# Patient Record
Sex: FEMALE | Race: WHITE | NOT HISPANIC OR LATINO | Employment: OTHER | URBAN - METROPOLITAN AREA
[De-identification: names, ages, dates, MRNs, and addresses within clinical notes are randomized per-mention and may not be internally consistent; named-entity substitution may affect disease eponyms.]

---

## 2017-06-19 ENCOUNTER — CONVERSION ENCOUNTER (OUTPATIENT)
Dept: GENERAL RADIOLOGY | Facility: HOSPITAL | Age: 57
End: 2017-06-19

## 2018-02-13 ENCOUNTER — OFFICE VISIT CONVERTED (OUTPATIENT)
Dept: ORTHOPEDIC SURGERY | Facility: CLINIC | Age: 58
End: 2018-02-13
Attending: PHYSICIAN ASSISTANT

## 2018-03-01 ENCOUNTER — OFFICE VISIT CONVERTED (OUTPATIENT)
Dept: ORTHOPEDIC SURGERY | Facility: CLINIC | Age: 58
End: 2018-03-01
Attending: ORTHOPAEDIC SURGERY

## 2018-07-12 ENCOUNTER — CONVERSION ENCOUNTER (OUTPATIENT)
Dept: GENERAL RADIOLOGY | Facility: HOSPITAL | Age: 58
End: 2018-07-12

## 2018-08-21 ENCOUNTER — OFFICE VISIT CONVERTED (OUTPATIENT)
Dept: GASTROENTEROLOGY | Facility: CLINIC | Age: 58
End: 2018-08-21
Attending: INTERNAL MEDICINE

## 2018-08-21 ENCOUNTER — CONVERSION ENCOUNTER (OUTPATIENT)
Dept: GASTROENTEROLOGY | Facility: CLINIC | Age: 58
End: 2018-08-21

## 2019-06-03 ENCOUNTER — HOSPITAL ENCOUNTER (OUTPATIENT)
Dept: OTHER | Facility: HOSPITAL | Age: 59
Discharge: HOME | End: 2019-06-03
Attending: INTERNAL MEDICINE

## 2019-06-03 LAB
25(OH)D3 SERPL-MCNC: 54.9 NG/ML (ref 30–100)
CALCIUM SERPL-MCNC: 9.7 MG/DL (ref 8.7–10.4)

## 2019-08-21 ENCOUNTER — HOSPITAL ENCOUNTER (OUTPATIENT)
Dept: GENERAL RADIOLOGY | Facility: HOSPITAL | Age: 59
Discharge: HOME | End: 2019-08-21
Attending: FAMILY MEDICINE

## 2019-11-27 ENCOUNTER — HOSPITAL ENCOUNTER (OUTPATIENT)
Dept: URGENT CARE | Facility: CLINIC | Age: 59
Discharge: HOME | End: 2019-11-27

## 2020-01-13 ENCOUNTER — HOSPITAL ENCOUNTER (OUTPATIENT)
Dept: GENERAL RADIOLOGY | Facility: HOSPITAL | Age: 60
Discharge: HOME | End: 2020-01-13
Attending: INTERNAL MEDICINE

## 2020-01-13 LAB
25(OH)D3 SERPL-MCNC: 42.2 NG/ML (ref 30–100)
CALCIUM SERPL-MCNC: 10.3 MG/DL (ref 8.7–10.4)

## 2020-01-14 ENCOUNTER — OFFICE VISIT CONVERTED (OUTPATIENT)
Dept: FAMILY MEDICINE CLINIC | Facility: CLINIC | Age: 60
End: 2020-01-14
Attending: NURSE PRACTITIONER

## 2020-01-14 ENCOUNTER — CONVERSION ENCOUNTER (OUTPATIENT)
Dept: FAMILY MEDICINE CLINIC | Facility: CLINIC | Age: 60
End: 2020-01-14

## 2020-01-22 ENCOUNTER — OFFICE VISIT CONVERTED (OUTPATIENT)
Dept: FAMILY MEDICINE CLINIC | Facility: CLINIC | Age: 60
End: 2020-01-22
Attending: NURSE PRACTITIONER

## 2020-03-18 ENCOUNTER — CONVERSION ENCOUNTER (OUTPATIENT)
Dept: FAMILY MEDICINE CLINIC | Facility: CLINIC | Age: 60
End: 2020-03-18

## 2020-03-18 ENCOUNTER — OFFICE VISIT CONVERTED (OUTPATIENT)
Dept: FAMILY MEDICINE CLINIC | Facility: CLINIC | Age: 60
End: 2020-03-18
Attending: NURSE PRACTITIONER

## 2020-04-20 ENCOUNTER — OFFICE VISIT CONVERTED (OUTPATIENT)
Dept: FAMILY MEDICINE CLINIC | Facility: CLINIC | Age: 60
End: 2020-04-20
Attending: NURSE PRACTITIONER

## 2020-05-04 ENCOUNTER — OFFICE VISIT CONVERTED (OUTPATIENT)
Dept: FAMILY MEDICINE CLINIC | Facility: CLINIC | Age: 60
End: 2020-05-04
Attending: FAMILY MEDICINE

## 2020-07-06 ENCOUNTER — HOSPITAL ENCOUNTER (OUTPATIENT)
Dept: GENERAL RADIOLOGY | Facility: HOSPITAL | Age: 60
Discharge: HOME | End: 2020-07-06
Attending: INTERNAL MEDICINE

## 2020-07-06 LAB
25(OH)D3 SERPL-MCNC: 56.1 NG/ML (ref 30–100)
CALCIUM SERPL-MCNC: 10 MG/DL (ref 8.7–10.4)

## 2020-07-07 ENCOUNTER — OFFICE VISIT CONVERTED (OUTPATIENT)
Dept: ORTHOPEDIC SURGERY | Facility: CLINIC | Age: 60
End: 2020-07-07
Attending: ORTHOPAEDIC SURGERY

## 2020-07-17 ENCOUNTER — TELEPHONE CONVERTED (OUTPATIENT)
Dept: FAMILY MEDICINE CLINIC | Facility: CLINIC | Age: 60
End: 2020-07-17
Attending: NURSE PRACTITIONER

## 2020-07-25 ENCOUNTER — HOSPITAL ENCOUNTER (OUTPATIENT)
Dept: URGENT CARE | Facility: CLINIC | Age: 60
Discharge: HOME | End: 2020-07-25

## 2020-07-31 ENCOUNTER — TELEPHONE CONVERTED (OUTPATIENT)
Dept: FAMILY MEDICINE CLINIC | Facility: CLINIC | Age: 60
End: 2020-07-31
Attending: FAMILY MEDICINE

## 2020-07-31 ENCOUNTER — HOSPITAL ENCOUNTER (OUTPATIENT)
Dept: OTHER | Facility: HOSPITAL | Age: 60
Discharge: HOME | End: 2020-07-31
Attending: FAMILY MEDICINE

## 2020-07-31 LAB
ALBUMIN SERPL-MCNC: 4.3 G/DL (ref 3.5–5)
ALBUMIN/GLOB SERPL: 1.4 {RATIO} (ref 1.4–2.6)
ALP SERPL-CCNC: 63 U/L (ref 53–141)
ALT SERPL-CCNC: 22 U/L (ref 10–40)
ANION GAP SERPL CALC-SCNC: 21 MMOL/L (ref 8–19)
AST SERPL-CCNC: 19 U/L (ref 15–50)
BASOPHILS # BLD AUTO: 0.02 10*3/UL (ref 0–0.2)
BASOPHILS NFR BLD AUTO: 0.2 % (ref 0–3)
BILIRUB SERPL-MCNC: 0.27 MG/DL (ref 0.2–1.3)
BUN SERPL-MCNC: 7 MG/DL (ref 5–25)
BUN/CREAT SERPL: 10 {RATIO} (ref 6–20)
CALCIUM SERPL-MCNC: 10.6 MG/DL (ref 8.7–10.4)
CHLORIDE SERPL-SCNC: 93 MMOL/L (ref 99–111)
CONV ABS IMM GRAN: 0.06 10*3/UL (ref 0–0.2)
CONV CO2: 30 MMOL/L (ref 22–32)
CONV IMMATURE GRAN: 0.7 % (ref 0–1.8)
CONV TOTAL PROTEIN: 7.3 G/DL (ref 6.3–8.2)
CREAT UR-MCNC: 0.73 MG/DL (ref 0.5–0.9)
CRP SERPL-MCNC: 3.9 MG/L (ref 0–5)
D DIMER PPP FEU-MCNC: 0.43 MG/L (ref 0–0.59)
DEPRECATED RDW RBC AUTO: 38.4 FL (ref 36.4–46.3)
EOSINOPHIL # BLD AUTO: 0.05 10*3/UL (ref 0–0.7)
EOSINOPHIL # BLD AUTO: 0.6 % (ref 0–7)
ERYTHROCYTE [DISTWIDTH] IN BLOOD BY AUTOMATED COUNT: 12.2 % (ref 11.7–14.4)
GFR SERPLBLD BASED ON 1.73 SQ M-ARVRAT: >60 ML/MIN/{1.73_M2}
GLOBULIN UR ELPH-MCNC: 3 G/DL (ref 2–3.5)
GLUCOSE SERPL-MCNC: 155 MG/DL (ref 65–99)
HCT VFR BLD AUTO: 39.9 % (ref 37–47)
HGB BLD-MCNC: 13.5 G/DL (ref 12–16)
LYMPHOCYTES # BLD AUTO: 1.21 10*3/UL (ref 1–5)
LYMPHOCYTES NFR BLD AUTO: 14.3 % (ref 20–45)
MCH RBC QN AUTO: 28.9 PG (ref 27–31)
MCHC RBC AUTO-ENTMCNC: 33.8 G/DL (ref 33–37)
MCV RBC AUTO: 85.4 FL (ref 81–99)
MONOCYTES # BLD AUTO: 0.46 10*3/UL (ref 0.2–1.2)
MONOCYTES NFR BLD AUTO: 5.4 % (ref 3–10)
NEUTROPHILS # BLD AUTO: 6.68 10*3/UL (ref 2–8)
NEUTROPHILS NFR BLD AUTO: 78.8 % (ref 30–85)
NRBC CBCN: 0 % (ref 0–0.7)
OSMOLALITY SERPL CALC.SUM OF ELEC: 291 MOSM/KG (ref 273–304)
PLATELET # BLD AUTO: 364 10*3/UL (ref 130–400)
PMV BLD AUTO: 8.3 FL (ref 9.4–12.3)
POTASSIUM SERPL-SCNC: 3.6 MMOL/L (ref 3.5–5.3)
RBC # BLD AUTO: 4.67 10*6/UL (ref 4.2–5.4)
SODIUM SERPL-SCNC: 140 MMOL/L (ref 135–147)
T4 FREE SERPL-MCNC: 1.4 NG/DL (ref 0.9–1.8)
TSH SERPL-ACNC: 0.29 M[IU]/L (ref 0.27–4.2)
WBC # BLD AUTO: 8.48 10*3/UL (ref 4.8–10.8)

## 2020-08-03 ENCOUNTER — HOSPITAL ENCOUNTER (OUTPATIENT)
Dept: GENERAL RADIOLOGY | Facility: HOSPITAL | Age: 60
Discharge: HOME | End: 2020-08-03
Attending: FAMILY MEDICINE

## 2020-08-03 LAB
EST. AVERAGE GLUCOSE BLD GHB EST-MCNC: 131 MG/DL
HBA1C MFR BLD: 6.2 % (ref 3.5–5.7)
PTH-INTACT SERPL-MCNC: 23 PG/ML (ref 11.1–79.5)

## 2020-08-04 LAB — CONV CALCIUM IONIZED: 5.5 MG/DL (ref 4.5–5.6)

## 2020-10-20 ENCOUNTER — HOSPITAL ENCOUNTER (OUTPATIENT)
Dept: GENERAL RADIOLOGY | Facility: HOSPITAL | Age: 60
Discharge: HOME | End: 2020-10-20
Attending: NURSE PRACTITIONER

## 2020-11-27 ENCOUNTER — HOSPITAL ENCOUNTER (OUTPATIENT)
Dept: URGENT CARE | Facility: CLINIC | Age: 60
Discharge: HOME | End: 2020-11-27
Attending: PHYSICIAN ASSISTANT

## 2020-12-13 ENCOUNTER — HOSPITAL ENCOUNTER (OUTPATIENT)
Dept: URGENT CARE | Facility: CLINIC | Age: 60
Discharge: HOME | End: 2020-12-13
Attending: EMERGENCY MEDICINE

## 2020-12-16 LAB — SARS-COV-2 RNA SPEC QL NAA+PROBE: NOT DETECTED

## 2021-04-12 ENCOUNTER — HOSPITAL ENCOUNTER (OUTPATIENT)
Dept: GENERAL RADIOLOGY | Facility: HOSPITAL | Age: 61
Discharge: HOME | End: 2021-04-12
Attending: INTERNAL MEDICINE

## 2021-04-12 LAB — CALCIUM SERPL-MCNC: 9.7 MG/DL (ref 8.7–10.4)

## 2021-04-13 LAB — 25(OH)D3 SERPL-MCNC: 42.5 NG/ML (ref 30–100)

## 2021-05-10 NOTE — H&P
History and Physical      Patient Name: Mahsa Spain   Patient ID: [id number]   Sex: Female   YOB: 1960    Primary Care Provider: Celsa BRYANT   Referring Provider: Celsa BRYANT    Visit Date: July 7, 2020    Provider: Mt Card MD   Location: [facility]   Location Address: [address] [place] [address]  [id number]   Location Phone: [phone]          Chief Complaint  · Left Knee Pain      History Of Present Illness  Mahsa Spain is a 59 year old /White female who presents today to [facility].      The patient presents today for evaluation of left knee pain. She reports the left knee has a nagging pain and is interested in an injection today. Patient reports she has retired and is trying to rest as she can. She reports the right knee is doing well and had this replaced in 2014.                 Past Medical History  Arthritis; Hemorrhoids; Hypertension; Primary osteoarthritis of left knee; Right Total Knee         Past Surgical History  *Other; Artificial Joints/Limbs; Colonoscopy; Hip fracture repair, right; Hysterectomy; Joint Surgery; Metal implants         Medication List  Benadryl 25 mg oral capsule; cranberry extract 500 mg oral capsule; diclofenac sodium 50 mg oral tablet,delayed release (DR/EC); elderberry fruit and flower 460-115 mg oral capsule; folic acid 400 mcg oral tablet; hydrocortisone 2.5 % topical cream; lisinopril-hydrochlorothiazide 20-25 mg oral tablet; magnesium 250 mg oral tablet; metoprolol succinate 25 mg oral tablet extended release 24 hr; potassium 99 mg oral tablet; Shingrix (PF) 50 mcg/0.5 mL intramuscular suspension for reconstitution; triamcinolone acetonide 0.1 % topical cream; Vitamin B-12 500 mcg oral lozenge; Vitamin C 500 mg oral capsule, extended release; Vitamin D3 400 unit oral capsule         Allergy List  NO KNOWN DRUG ALLERGIES       Allergies Reconciled  Family Medical History  Osteoarthrosis;  "Depression; Hypertension; Family history of certain chronic disabling diseases; arthritis; Osteoporosis; Family history of Arthritis         Social History  Alcohol (Never); Alcohol Use (Current some day); Denies substance abuse; .; Exercises regularly; lives alone; Recreational Drug Use (Never); Retired.; Teacher; 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); Working         Review of Systems  · Constitutional  o Denies  o : fever, chills, weight loss  · Cardiovascular  o Denies  o : chest pain, shortness of breath  · Gastrointestinal  o Denies  o : liver disease, heartburn, nausea, blood in stools  · Genitourinary  o Denies  o : painful urination, blood in urine  · Integument  o Denies  o : rash, itching  · Neurologic  o Denies  o : headache, weakness, loss of consciousness  · Musculoskeletal  o Denies  o : painful, swollen joints  · Psychiatric  o Denies  o : drug/alcohol addiction, anxiety, depression      Vitals  Date Time BP Position Site L\R Cuff Size HR RR TEMP (F) WT  HT  BMI kg/m2 BSA m2 O2 Sat        07/07/2020 09:23 AM      78 - R   173lbs 0oz 5'  3\" 30.65 1.87 97 %          Physical Examination  · Constitutional  o Appearance  o : well developed, well-nourished, no obvious deformities present  · Head and Face  o Head  o :   § Inspection  § : normocephalic  o Face  o :   § Inspection  § : no facial lesions  · Eyes  o Conjunctivae  o : conjunctivae normal  o Sclerae  o : sclerae white  · Ears, Nose, Mouth and Throat  o Ears  o :   § External Ears  § : appearance within normal limits  § Hearing  § : intact  o Nose  o :   § External Nose  § : appearance normal  · Neck  o Inspection/Palpation  o : normal appearance  o Range of Motion  o : full range of motion  · Respiratory  o Respiratory Effort  o : breathing unlabored  o Inspection of Chest  o : normal appearance  o Auscultation of Lungs  o : no audible wheezing or rales  · Cardiovascular  o Heart  o : regular rate  · Gastrointestinal  o Abdominal Examination  o : soft and " non-tender  · Skin and Subcutaneous Tissue  o General Inspection  o : intact, no rashes  · Psychiatric  o General  o : Alert and oriented x3  o Judgement and Insight  o : judgment and insight intact  o Mood and Affect  o : mood normal, affect appropriate  · Left Knee  o Inspection  o : Full extension to flexion of 120 degrees, sensation grossly intact, Positive pulses, stable to stress, full weight-bearing, ambulates with a nonantalgic gait, Non-tender to palpation, skin intact, no skin discoloration, mild swelling   · Injection Note/Aspiration Note  o Site  o : left knee   o Procedure  o : Procedure: After educating the patient, patient gave consent for procedure. After using Chloraprep, the joint space was injected. The patient tolerated the procedure well.   o Medication  o : 80 mg of DepoMedrol with 9cc of 1% Lidocaine  · In Office Procedures  o View  o : LAT/SUNRISE/STANDING   o Site  o : left, knee  o Indication  o : Left knee pain   o Study  o : X-rays ordered, taken in the office, and reviewed today.  o Xray  o : reveals advanced degenerative changes with bone on bone articulation, subchondral sclerosis, osteophyte formation   o Comparative Data  o : No comparative data found              Assessment  · Primary osteoarthritis of left knee     715.16/M17.12  · Left knee pain, unspecified chronicity     719.46/M25.562      Plan  · Orders  o Depo-Medrol injection 80mg () - 715.16/M17.12 - 07/07/2020   Lot [id number] Exp 06 2021 [employer] Administered by JULIO Card MD  o Knee Intra-articular Injection without US Guidance [facility] (80989) - 715.16/M17.12 - 07/07/2020   Lot [id number] Exp 07 01 2021 [employer] Administered by JULIO Card MD  o Knee (Left) [facility] ([id number]) - 719.46/M25.562 - 07/07/2020  · Medications  o Medications have been Reconciled  o Transition of Care or Provider Policy  · Instructions  o Reviewed the patient's Past Medical, Social, and Family history as well as the ROS at today's  visit, no changes.  o Call or return if worsening symptoms.  o X-ray ordered, taken and reviewed at this visit.  o Follow up as needed.  o The above service was scribed by Shantell Ortiz on my behalf and I attest to the accuracy of the note. mc  o She is making a trip to [place] and wished to proceed with a steroid injection today.             Electronically Signed by: Shantell Ortiz-, Other -Author on July 7, 2020 08:49:17 PM  Electronically Co-signed by: Mt Card MD -Reviewer on July 8, 2020 03:57:54 PM

## 2021-05-12 NOTE — PROGRESS NOTES
Progress Note      Patient Name: Mahsa Spain   Patient ID: [id number]   Sex: Female   YOB: 1960    Primary Care Provider: Celsa BRYANT   Referring Provider: Celsa BRYANT    Visit Date: April 20, 2020    Provider: MANNY Ogden   Location: [facility]   Location Address: [address] [place] [address] [address]  [place], [place]  [id number]   Location Phone: [phone]          Chief Complaint  · Rash on legs.      History Of Present Illness  Mahsa Spain is a 59 year old /White female who presents for evaluation and treatment of:      Pt c/o rash on bilat lower legs x 3 days. States she was outside weed-eating in tall grass on Saturday and noticed bumps after that. States they are mildly itchy, denies pain. Denies fever, cough, SOB. She has been using Neosporin to treat. Denies rash being present on any other body parts.       Past Medical History  Disease Name Date Onset Notes   Arthritis --  --    Hemorrhoids --  --    Hypertension --  --    Primary osteoarthritis of left knee 02/13/2018 --    Right Total Knee 06/12/2015 --          Past Surgical History  Procedure Name Date Notes   *Other --  right knee scope   Hip fracture repair, right --  --    Hysterectomy  --    Joint Surgery --  --    Metal implants --  --          Medication List  Name Date Started Instructions   cranberry extract 500 mg oral capsule  take 1 capsule by oral route daily   diclofenac sodium 50 mg oral tablet,delayed release (DR/EC) 03/18/2020 take 1 tablet (50 mg) by oral route 3 times per day   elderberry fruit and flower 460-115 mg oral capsule  take 1 capsule by oral route daily for 30 days   folic acid 400 mcg oral tablet  take 1 tablet (0.4 mg) by oral route once daily   lisinopril-hydrochlorothiazide 20-25 mg oral tablet  --    magnesium 250 mg oral tablet  take 1 tablet by oral route daily   metoprolol succinate 25 mg oral tablet extended release 24 hr  take 1  tablet (25 mg) by oral route once daily for 30 days   potassium 99 mg oral tablet  take 1 tablet by oral route daily for 30 days   Vitamin B-12 500 mcg oral lozenge  dissolve 1 lozenge by oral route daily   Vitamin C 500 mg oral capsule, extended release  take 1 capsule by oral route daily   Vitamin D3 400 unit oral capsule  take 1 capsule by oral route daily         Allergy List  Allergen Name Date Reaction Notes   NO KNOWN DRUG ALLERGIES --  --  --        Allergies Reconciled  Family Medical History  Disease Name Relative/Age Notes   Osteoarthrosis Mother/   --    Depression Mother/   --    Hypertension Mother/   --    Family history of certain chronic disabling diseases; arthritis Mother/   Mother  Mother; Brother   Osteoporosis Mother/   Mother         Social History  Finding Status Start/Stop Quantity Notes   Alcohol Never --/-- --  --    Denies substance abuse --  --/-- --  --    . --  --/-- --  --    Exercises regularly --  --/-- --  Walks daily x 30 minutes   lives alone --  --/-- --  --    Recreational Drug Use Never --/-- --  no   Teacher --  --/-- --  --    Tobacco Former --/-- --  former smoker  never smoker   Working --  --/-- --  --          Review of Systems  · Constitutional  o Denies  o : fever, fatigue, weight loss, weight gain  · HENT  o Denies  o : headaches  · Cardiovascular  o Denies  o : lower extremity edema, claudication, chest pressure, palpitations  · Respiratory  o Denies  o : shortness of breath, wheezing, cough, hemoptysis, dyspnea on exertion  · Gastrointestinal  o Denies  o : nausea, vomiting, diarrhea, constipation, abdominal pain  · Integument  o Admits  o : rash, pruritus  · Psychiatric  o Denies  o : anxiety, depression, suicidal ideation, homicidal ideation  · Allergic-Immunologic  o Denies  o : sinus allergy symptoms, frequent illnesses      Vitals  Date Time BP Position Site L\R Cuff Size HR RR TEMP (F) WT  HT  BMI kg/m2 BSA m2 O2 Sat        04/20/2020 03:37 PM  "144/73 Sitting    82 - R 20 98.3 168lbs 1oz 5'  3\" 29.77 1.84 99 %          Physical Examination  · Constitutional  o Appearance  o : no acute distress, well-nourished  · Head and Face  o Head  o :   § Inspection  § : atraumatic, normocephalic  o Face  o :   § Inspection  § : no facial lesions  · Eyes  o Eyes  o : extraocular movements intact, no scleral icterus, no conjunctival injection  · Ears, Nose, Mouth and Throat  o Ears  o :   § External Ears  § : normal  o Nose  o :   § Intranasal Exam  § : nares patent  o Oral Cavity  o :   § Oral Mucosa  § : moist mucous membranes  · Respiratory  o Respiratory Effort  o : breathing comfortably, symmetric chest rise  o Auscultation of Lungs  o : clear to asculatation bilaterally, no wheezes, rales, or rhonchii  · Cardiovascular  o Heart  o :   § Auscultation of Heart  § : regular rate and rhythm, no murmurs, rubs, or gallops  o Peripheral Vascular System  o :   § Extremities  § : no edema  · Lymphatic  o Neck  o : no lymphadenopathy present  o Supraclavicular Nodes  o : no supraclavicular nodes  · Skin and Subcutaneous Tissue  o General Inspection  o : papular eruption present, scattered, erythemic, raised, blister-like lesions bilat lower extremeties, no pus, discharge, or edema  · Neurologic  o Mental Status Examination  o :   § Orientation  § : grossly oriented to person, place and time  o Gait and Station  o :   § Gait Screening  § : normal gait  · Psychiatric  o General  o : normal mood and affect              Assessment  · Need for shingles vaccine     V04.89/Z23  · Rash     782.1/R21  · Chiggers (mites)     133.9/B88.9       Chiggers bites:  Benadryl 2 tabs q6hrs PRN; may cause drowsiness  Hydrocortisone 2.5% apply tid PRN; apply sparingly  Avoid scratching  keep clean and dry  Wear long sleeves/pants when outside in tall grass  Wear insect repellant when working outside  Wash skin with soap and water after outdoor activity  Cool showers  Calamine lotion PRN "     Order given for Shingles vaccine. Pt has never had vaccine before. Episode of shingles last year.     Problems Reconciled  Plan  · Orders  o ACO-39: Current medications updated and reviewed () - - 04/20/2020  o ACO-14: Influenza immunization administered or previously received () - - 04/20/2020   10/2019  · Medications  o hydrocortisone 2.5 % topical cream   SIG: apply a thin layer to the affected area(s) by topical route 3 times per day   DISP: (1) 3.5 gm tube with 0 refills  Prescribed on 04/20/2020     o Benadryl 25 mg oral capsule   SIG: take 2 capsules (50 mg) by oral route every 6 hours as needed   DISP: (60) capsules with 3 refills  Prescribed on 04/20/2020     o Shingrix (PF) 50 mcg/0.5 mL intramuscular suspension for reconstitution   SIG: inject 0.5 milliliter (50 mcg) by intramuscular route once repeat 0.5 mL dose 2 to 6 months after the first dose (total of 2 doses)   DISP: (1) Kit with 0 refills  Prescribed on 04/20/2020     o Medications have been Reconciled  o Transition of Care or Provider Policy  · Instructions  o Discussed the need for Shingles vaccination with patient.   o Patient is taking medications as prescribed and doing well.   o Take all medications as prescribed/directed.  o Rest. Increase Fluids.  o Patient was educated/instructed on their diagnosis, treatment and medications prior to discharge from the clinic today.  o Patient instructed to seek medical attention urgently for new or worsening symptoms.  o Call the office with any concerns or questions.  o Bring all medicines with their bottles to each office visit.  o Risks, benefits, and alternatives were discussed with the patient. The patient is aware of risks associated with: all meds and conditions  o Chronic conditions reviewed and taken into consideration for today's treatment plan.  o Discussed Covid-19 precautions including, but not limited to, social distancing, avoid touching your face, and hand washing.    · Disposition  o Call or Return if symptoms worsen or persist.  o Follow Up PRN.  o Proceed to ED for all medical emergencies.            Electronically Signed by: MANNY Ogden -Author on April 20, 2020 04:17:50 PM

## 2021-05-13 NOTE — PROGRESS NOTES
Quick Note      Patient Name: Mahsa Spain   Patient ID: [id number]   Sex: Female   YOB: 1960    Primary Care Provider: Celsa BRYANT   Referring Provider: Celsa BRYANT    Visit Date: July 31, 2020    Provider: Tato Duran DO   Location: [facility]   Location Address: [address] [place] [address] [address]  [place], [place]  [id number]   Location Phone: [phone]          History Of Present Illness  TELEHEALTH TELEPHONE VISIT  Chief Complaint: fatigue, not feeling right   Mahsa Spain is a 59 year old /White female who is presenting for evaluation via telehealth telephone visit. Verbal consent obtained before beginning visit.   Provider spent 8 minutes with patient during telehealth visit.   The following staff were present during this visit: Tato Duran DO   Past Medical History/Overview of Patient Symptoms       Patient calls complaining of not feeling herself was seen in urgent care recently and had blood work with CBC mild white blood cell count elevation EKG normal orthostatics potentially showing orthostatic hypotension.  Advised increase fluids and follow-up with PCP or doctor's office and she says she done that and has not had any more episodes like she did but is still not feeling herself.  Feels fatigue no fever sick contacts chest pain palpitations headache or other    She did go to the zoo couple weeks ago in [place] and says she followed all guidelines and such denies shortness of breath           Assessment  · Fatigue     780.79/R53.83  · Syncope     780.2/R55  · Leukocytosis     288.60/D72.829  · URI (upper respiratory infection)     465.9/J06.9         Leukocytosis  Syncope  URI  Fatigue  Dehydration  *Subacute  Travel to [place] to 2 weeks ago, has been feeling fatigued and like something is not right, had a syncopal event and orthostatics labs unremarkable other than elevated white blood cell at urgent care  EKG normal  additionally at that time  Repeat CBC, will add CMP thyroid panel additionally CRP d-dimer close follow-up and monitoring  Consider echo repeat EKG or Holter monitor  Increase fluids monitor symptoms follow-up as appropriate ED if worse    Follow-up as above labs today review of symptoms possible       Plan  · Orders  o CMP [facility] (19772) - 780.79/R53.83 - 07/31/2020  o CBC with Auto Diff [facility] (70272) - 780.2/R55, 780.79/R53.83, 288.60/D72.829, 465.9/J06.9 - 07/31/2020  o Thyroid Profile (20734, 31546, THYII) - 780.2/R55, 780.79/R53.83, 288.60/D72.829, 465.9/J06.9 - 07/31/2020  o ACO-39: Current medications updated and reviewed () - - 07/31/2020  o CRP (Inflammation) [facility] (02281) - 780.2/R55, 780.79/R53.83, 288.60/D72.829, 465.9/J06.9 - 07/31/2020  o D-dimer (83443) - 780.2/R55, 780.79/R53.83, 288.60/D72.829, 465.9/J06.9 - 07/31/2020  · Medications  o azithromycin 250 mg oral tablet   SIG: take 2 tablets (500 mg) by oral route once daily for 1 day then 1 tablet (250 mg) by oral route once daily for 4 days   DISP: (6) tablets with 0 refills  Discontinued on 07/31/2020     o Medrol (Henry) 4 mg oral tablets,dose pack   SIG: take as directed   DISP: (1) 21 ct dose-pack with 0 refills  Discontinued on 07/31/2020     o Tessalon Perles 100 mg oral capsule   SIG: take 1 capsule (100 mg) by oral route 3 times per day as needed for cough   DISP: (20) capsules with 0 refills  Discontinued on 07/31/2020     o Medications have been Reconciled  · Instructions  o Plan Of Care:   o Call the office with any concerns or questions.  o Risks, benefits, and alternatives were discussed with the patient. The patient is aware of risks associated with:  · Disposition  o Call or Return if symptoms worsen or persist.            Electronically Signed by: Tato Duran DO -Author on July 31, 2020 04:38:53 PM

## 2021-05-13 NOTE — PROGRESS NOTES
Progress Note      Patient Name: Mahsa Spain   Patient ID: [id number]   Sex: Female   YOB: 1960    Primary Care Provider: Celsa BRYANT   Referring Provider: Celsa BRYANT    Visit Date: May 4, 2020    Provider: Addis Hazel DO   Location: [facility]   Location Address: [address] [place] [address] [address]  [place], [place]  [id number]   Location Phone: [phone]          Chief Complaint  · Bilateral leg rash x 3-4 weeks       History Of Present Illness  Mahsa Spain is a 59 year old /White female who presents for evaluation and treatment of:      She is here today for an acute visit.  She was here approximately 3 weeks ago due to what appeared to be being bitten by insects.  She says that she was prescribed hydrocortisone cream and that her symptoms resolved.  She does admit that she has been outdoors mowing grass recently.  She is complained today of bilateral leg itching and rash.  She says she tried to apply the hydrocortisone cream she had left over on her legs this time but the rash has not significantly improved.       Past Medical History  Disease Name Date Onset Notes   Arthritis --  --    Hemorrhoids --  --    Hypertension --  --    Primary osteoarthritis of left knee 02/13/2018 --    Right Total Knee 06/12/2015 --          Past Surgical History  Procedure Name Date Notes   *Other --  right knee scope   Hip fracture repair, right --  --    Hysterectomy  --    Joint Surgery --  --    Metal implants --  --          Medication List  Name Date Started Instructions   Benadryl 25 mg oral capsule 04/20/2020 take 2 capsules (50 mg) by oral route every 6 hours as needed   cranberry extract 500 mg oral capsule  take 1 capsule by oral route daily   diclofenac sodium 50 mg oral tablet,delayed release (DR/EC) 03/18/2020 take 1 tablet (50 mg) by oral route 3 times per day   elderberry fruit and flower 460-115 mg oral capsule  take 1 capsule by oral  route daily for 30 days   folic acid 400 mcg oral tablet  take 1 tablet (0.4 mg) by oral route once daily   hydrocortisone 2.5 % topical cream 04/20/2020 apply a thin layer to the affected area(s) by topical route 3 times per day   lisinopril-hydrochlorothiazide 20-25 mg oral tablet  --    magnesium 250 mg oral tablet  take 1 tablet by oral route daily   metoprolol succinate 25 mg oral tablet extended release 24 hr  take 1 tablet (25 mg) by oral route once daily for 30 days   potassium 99 mg oral tablet  take 1 tablet by oral route daily for 30 days   Shingrix (PF) 50 mcg/0.5 mL intramuscular suspension for reconstitution 04/20/2020 inject 0.5 milliliter (50 mcg) by intramuscular route once repeat 0.5 mL dose 2 to 6 months after the first dose (total of 2 doses)   Vitamin B-12 500 mcg oral lozenge  dissolve 1 lozenge by oral route daily   Vitamin C 500 mg oral capsule, extended release  take 1 capsule by oral route daily   Vitamin D3 400 unit oral capsule  take 1 capsule by oral route daily         Allergy List  Allergen Name Date Reaction Notes   NO KNOWN DRUG ALLERGIES --  --  --          Family Medical History  Disease Name Relative/Age Notes   Osteoarthrosis Mother/   --    Depression Mother/   --    Hypertension Mother/   --    Family history of certain chronic disabling diseases; arthritis Mother/   Mother  Mother; Brother   Osteoporosis Mother/   Mother         Social History  Finding Status Start/Stop Quantity Notes   Alcohol Never --/-- --  --    Denies substance abuse --  --/-- --  --    . --  --/-- --  --    Exercises regularly --  --/-- --  Walks daily x 30 minutes   lives alone --  --/-- --  --    Recreational Drug Use Never --/-- --  no   Teacher --  --/-- --  --    Tobacco Former --/-- --  former smoker  never smoker   Working --  --/-- --  --          Review of Systems  · Constitutional  o * See HPI  · Cardiovascular  o * See HPI  · Respiratory  o * See HPI  · Gastrointestinal  o * See  "HPI  · Integument  o * See HPI      Vitals  Date Time BP Position Site L\R Cuff Size HR RR TEMP (F) WT  HT  BMI kg/m2 BSA m2 O2 Sat HC       05/04/2020 02:59 /68 Sitting    86 - R 18 98.1 168lbs 7oz 5'  3\" 29.84 1.84 98 %    05/04/2020 02:59 /69 Sitting                     Physical Examination  · Constitutional  o Appearance  o : overweight, well developed, alert, no obvious deformities present, no acute distress  · Head and Face  o Head  o :   § Inspection  § : atraumatic, normocephalic  · Ears, Nose, Mouth and Throat  o Ears  o :   § External Ears  § : normal  o Nose  o :   § Intranasal Exam  § : nares patent  o Oral Cavity  o :   § Oral Mucosa  § : moist mucous membranes  · Respiratory  o Respiratory Effort  o : breathing comfortably, symmetric chest rise  o Auscultation of Lungs  o : clear to asculatation bilaterally, no wheezes, rales, or rhonchii  · Cardiovascular  o Heart  o :   § Auscultation of Heart  § : regular rate and rhythm, no murmurs, rubs, or gallops  o Peripheral Vascular System  o :   § Extremities  § : no edema  · Skin and Subcutaneous Tissue  o General Inspection  o : Fine rash just above the sock line to mid shins bilateral lower extremities.  · Neurologic  o Mental Status Examination  o :   § Orientation  § : grossly oriented to person, place and time  o Gait and Station  o :   § Gait Screening  § : normal gait  · Psychiatric  o General  o : normal mood and affect              Assessment  · Rash     782.1/R21      Plan  · Orders  o ACO-39: Current medications updated and reviewed () - - 05/04/2020  o ACO-14: Influenza immunization administered or previously received () - - 05/04/2020  · Medications  o triamcinolone acetonide 0.1 % topical cream   SIG: apply a thin layer to the affected area(s) by topical route 2 times per day for 7 days   DISP: (1) 45 gm tube with 1 refills  Prescribed on 05/04/2020     o Medications have been Reconciled  o Transition of Care or Provider " Policy  · Instructions  o Patient was educated/instructed on their diagnosis, treatment and medications prior to discharge from the clinic today.  · Disposition  o Call or Return if symptoms worsen or persist.     1.  Rash: The patient was given a prescription today of triamcinolone cream to be used as directed.  She was encouraged to wear long pants and avoid contact with grass clippings.  Follow-up as needed.             Electronically Signed by: Addis Hazel DO -Author on May 17, 2020 07:50:49 PM

## 2021-05-13 NOTE — PROGRESS NOTES
Progress Note      Patient Name: Mahsa Spain   Patient ID: [id number]   Sex: Female   YOB: 1960    Primary Care Provider: Celsa BRYANT   Referring Provider: Celsa BRYANT    Visit Date: July 17, 2020    Provider: MANNY Ogden   Location: [facility]   Location Address: [address] [place] [address] [address]  [place], [place]  [id number]   Location Phone: [phone]          Chief Complaint  · Sinus congestion, pressure, drainage, and cough.  · No fever  · Did have a nose bleed yesterday, stated she usually has them when she feels like this.  · Itchy throat      History Of Present Illness  TELEHEALTH TELEPHONE VISIT  Chief Complaint: Sinus infection   Mahsa Spain is a 59 year old /White female who is presenting for evaluation via telehealth telephone visit. Verbal consent obtained before beginning visit.   Provider spent 12 minutes with patient during telehealth visit.   The following staff were present during this visit: Bethany Casiano MA & Celsa BRYANT.   Past Medical History/Overview of Patient Symptoms     Pt c/o sinus pain/pressure, nasal congestion, cough, scratchy throat, nasal drainage, HA x 3-4 days. Has taken Benadryl to treat.   Denies fever, SOB, muscle aches, sore throat, chills. Denies any recent travel or sick contacts.       Past Medical History  Disease Name Date Onset Notes   Arthritis --  --    Hemorrhoids --  --    Hypertension --  --    Primary osteoarthritis of left knee 02/13/2018 --    Right Total Knee 06/12/2015 --          Past Surgical History  Procedure Name Date Notes   *Other --  right knee scope   Artificial Joints/Limbs --  --    Colonoscopy --  --    Hip fracture repair, right --  --    Hysterectomy  --    Joint Surgery --  --    Metal implants --  --          Medication List  Name Date Started Instructions   Benadryl 25 mg oral capsule 04/20/2020 take 2 capsules (50 mg) by oral route every 6 hours as  needed   cranberry extract 500 mg oral capsule  take 1 capsule by oral route daily   elderberry fruit and flower 460-115 mg oral capsule  take 1 capsule by oral route daily for 30 days   folic acid 400 mcg oral tablet  take 1 tablet (0.4 mg) by oral route once daily   lisinopril-hydrochlorothiazide 20-25 mg oral tablet  --    magnesium 250 mg oral tablet  take 1 tablet by oral route daily   metoprolol succinate 25 mg oral tablet extended release 24 hr  take 1 tablet (25 mg) by oral route once daily for 30 days   potassium 99 mg oral tablet  take 1 tablet by oral route daily for 30 days   Vitamin B-12 500 mcg oral lozenge  dissolve 1 lozenge by oral route daily   Vitamin C 500 mg oral capsule, extended release  take 1 capsule by oral route daily   Vitamin D3 400 unit oral capsule  take 1 capsule by oral route daily         Allergy List  Allergen Name Date Reaction Notes   NO KNOWN DRUG ALLERGIES --  --  --        Allergies Reconciled  Family Medical History  Disease Name Relative/Age Notes   Osteoarthrosis Mother/   --    Depression Mother/   --    Hypertension Mother/   --    Family history of certain chronic disabling diseases; arthritis Mother/   Mother  Mother; Brother   Osteoporosis Mother/   Mother   Family history of Arthritis Mother/   Mother         Social History  Finding Status Start/Stop Quantity Notes   Alcohol Use Current some day --/-- --  rarely drinks, less than 1 drink per day   Denies substance abuse --  --/-- --  --    . --  --/-- --  --    Exercises regularly --  --/-- --  Walks daily x 30 minutes   lives alone --  --/-- --  --    Retired. --  --/-- --  --    Teacher --  --/-- --  --    Tobacco Never --/-- --  never smoker  former smoker  never smoker         Review of Systems  · Constitutional  o Denies  o : fatigue, fever, chills, body aches, night sweats  · Eyes  o Denies  o : double vision, blurred vision  · HENT  o Admits  o : headaches, sinus pain, nasal congestion, nasal  discharge, hoarseness  o Denies  o : sore throat, ear pain  · Cardiovascular  o Denies  o : chest pain, irregular heart beats  · Respiratory  o Admits  o : productive cough  o Denies  o : shortness of breath  · Gastrointestinal  o Denies  o : nausea, vomiting  · Integument  o Denies  o : new skin lesions  · Neurologic  o Denies  o : altered mental status, seizures  · Musculoskeletal  o Denies  o : joint swelling, limitation of motion  · Heme-Lymph  o Denies  o : petechiae, lymph node enlargement or tenderness  · Allergic-Immunologic  o Denies  o : frequent illnesses          Assessment  · Allergic rhinitis due to allergen     477.9/J30.9  · Cough     786.2/R05  · Essential hypertension     401.9/I10  · Acute non-recurrent maxillary sinusitis     461.0/J01.00  · Sinus congestion     478.19/R09.81       Max sinusitis:  Zpack UAD  Medrol Dose Henry UAD  Tessalon Perles 100mg PO tid PRN cough  Increase rest  Increase clear fluids  Ibu/tyl UAD q6 hrs PRN pain       Plan  · Orders  o ACO-39: Current medications updated and reviewed () - - 07/17/2020  · Medications  o azithromycin 250 mg oral tablet   SIG: take 2 tablets (500 mg) by oral route once daily for 1 day then 1 tablet (250 mg) by oral route once daily for 4 days   DISP: (6) tablets with 0 refills  Prescribed on 07/17/2020     o Medrol (Henry) 4 mg oral tablets,dose pack   SIG: take as directed   DISP: (1) 21 ct dose-pack with 0 refills  Prescribed on 07/17/2020     o Tessalon Perles 100 mg oral capsule   SIG: take 1 capsule (100 mg) by oral route 3 times per day as needed for cough   DISP: (20) capsules with 0 refills  Prescribed on 07/17/2020     o Medications have been Reconciled  o Transition of Care or Provider Policy  · Instructions  o Plan Of Care: sinusitis  o Chronic conditions reviewed and taken into consideration for today's treatment plan.  o Patient instructed to seek medical attention urgently for new or worsening symptoms.  o Patient was  educated/instructed on their diagnosis, treatment and medications prior to discharge from the clinic today.  o Take all medications as prescribed/directed.  o Rest. Increase Fluids.  o Call the office with any concerns or questions.  o Risks, benefits, and alternatives were discussed with the patient. The patient is aware of risks associated with: all meds and conditions.   o Discussed Covid-19 precautions including, but not limited to, social distancing, avoid touching your face, and hand washing.   · Disposition  o Call or Return if symptoms worsen or persist.  o Follow Up PRN.  o Proceed to ED for all medical emergencies.            Electronically Signed by: MANNY Ogden -Author on July 17, 2020 09:08:06 AM

## 2021-05-15 VITALS
HEIGHT: 63 IN | DIASTOLIC BLOOD PRESSURE: 71 MMHG | OXYGEN SATURATION: 99 % | BODY MASS INDEX: 30.16 KG/M2 | SYSTOLIC BLOOD PRESSURE: 154 MMHG | DIASTOLIC BLOOD PRESSURE: 65 MMHG | SYSTOLIC BLOOD PRESSURE: 148 MMHG | RESPIRATION RATE: 20 BRPM | HEART RATE: 72 BPM | WEIGHT: 170.25 LBS | TEMPERATURE: 97.6 F

## 2021-05-15 VITALS
DIASTOLIC BLOOD PRESSURE: 70 MMHG | RESPIRATION RATE: 20 BRPM | BODY MASS INDEX: 29.81 KG/M2 | HEIGHT: 63 IN | TEMPERATURE: 97.8 F | WEIGHT: 168.25 LBS | SYSTOLIC BLOOD PRESSURE: 140 MMHG | HEART RATE: 80 BPM | OXYGEN SATURATION: 99 %

## 2021-05-15 VITALS
WEIGHT: 168.44 LBS | TEMPERATURE: 98.1 F | DIASTOLIC BLOOD PRESSURE: 68 MMHG | SYSTOLIC BLOOD PRESSURE: 156 MMHG | OXYGEN SATURATION: 98 % | HEART RATE: 86 BPM | RESPIRATION RATE: 18 BRPM | HEIGHT: 63 IN | BODY MASS INDEX: 29.84 KG/M2

## 2021-05-15 VITALS
BODY MASS INDEX: 29.78 KG/M2 | HEART RATE: 82 BPM | DIASTOLIC BLOOD PRESSURE: 73 MMHG | OXYGEN SATURATION: 99 % | TEMPERATURE: 98.3 F | HEIGHT: 63 IN | WEIGHT: 168.06 LBS | RESPIRATION RATE: 20 BRPM | SYSTOLIC BLOOD PRESSURE: 144 MMHG

## 2021-05-15 VITALS — WEIGHT: 173 LBS | HEART RATE: 78 BPM | BODY MASS INDEX: 30.65 KG/M2 | OXYGEN SATURATION: 97 % | HEIGHT: 63 IN

## 2021-05-15 VITALS
BODY MASS INDEX: 29.77 KG/M2 | HEART RATE: 83 BPM | HEIGHT: 63 IN | WEIGHT: 168 LBS | OXYGEN SATURATION: 98 % | DIASTOLIC BLOOD PRESSURE: 67 MMHG | SYSTOLIC BLOOD PRESSURE: 141 MMHG

## 2021-05-16 VITALS — WEIGHT: 160 LBS | HEART RATE: 93 BPM | HEIGHT: 63 IN | BODY MASS INDEX: 28.35 KG/M2 | OXYGEN SATURATION: 98 %

## 2021-05-16 VITALS
WEIGHT: 170 LBS | DIASTOLIC BLOOD PRESSURE: 73 MMHG | BODY MASS INDEX: 30.12 KG/M2 | SYSTOLIC BLOOD PRESSURE: 132 MMHG | HEIGHT: 63 IN

## 2021-05-16 VITALS — WEIGHT: 160 LBS | HEART RATE: 78 BPM | OXYGEN SATURATION: 98 % | HEIGHT: 63 IN | BODY MASS INDEX: 28.35 KG/M2

## 2021-08-18 ENCOUNTER — TELEPHONE (OUTPATIENT)
Dept: FAMILY MEDICINE CLINIC | Facility: CLINIC | Age: 61
End: 2021-08-18

## 2021-08-18 DIAGNOSIS — Z12.31 ENCOUNTER FOR SCREENING MAMMOGRAM FOR MALIGNANT NEOPLASM OF BREAST: Primary | ICD-10-CM

## 2021-08-23 ENCOUNTER — TRANSCRIBE ORDERS (OUTPATIENT)
Dept: ADMINISTRATIVE | Facility: HOSPITAL | Age: 61
End: 2021-08-23

## 2021-08-23 DIAGNOSIS — M81.0 OSTEOPOROSIS, POST-MENOPAUSAL: Primary | ICD-10-CM

## 2021-08-24 RX ORDER — LISINOPRIL AND HYDROCHLOROTHIAZIDE 25; 20 MG/1; MG/1
TABLET ORAL
Qty: 90 TABLET | Refills: 1 | Status: SHIPPED | OUTPATIENT
Start: 2021-08-24 | End: 2022-02-16

## 2021-09-24 ENCOUNTER — OFFICE VISIT (OUTPATIENT)
Dept: FAMILY MEDICINE CLINIC | Facility: CLINIC | Age: 61
End: 2021-09-24

## 2021-09-24 VITALS
RESPIRATION RATE: 20 BRPM | HEIGHT: 63 IN | OXYGEN SATURATION: 98 % | DIASTOLIC BLOOD PRESSURE: 67 MMHG | HEART RATE: 81 BPM | TEMPERATURE: 98.2 F | WEIGHT: 165 LBS | BODY MASS INDEX: 29.23 KG/M2 | SYSTOLIC BLOOD PRESSURE: 140 MMHG

## 2021-09-24 DIAGNOSIS — J01.00 ACUTE NON-RECURRENT MAXILLARY SINUSITIS: ICD-10-CM

## 2021-09-24 DIAGNOSIS — H92.09 OTALGIA, UNSPECIFIED LATERALITY: Primary | ICD-10-CM

## 2021-09-24 DIAGNOSIS — J34.89 SINUS PAIN: ICD-10-CM

## 2021-09-24 DIAGNOSIS — M19.90 ARTHRITIS: ICD-10-CM

## 2021-09-24 PROCEDURE — 99213 OFFICE O/P EST LOW 20 MIN: CPT | Performed by: NURSE PRACTITIONER

## 2021-09-24 RX ORDER — DICLOFENAC SODIUM 75 MG/1
75 TABLET, DELAYED RELEASE ORAL 2 TIMES DAILY
Qty: 60 TABLET | Refills: 2 | Status: SHIPPED | OUTPATIENT
Start: 2021-09-24 | End: 2021-11-19

## 2021-09-24 RX ORDER — METOPROLOL SUCCINATE 25 MG/1
25 TABLET, EXTENDED RELEASE ORAL DAILY
COMMUNITY
Start: 2021-08-12 | End: 2021-12-10 | Stop reason: SDUPTHER

## 2021-09-24 RX ORDER — AZITHROMYCIN 250 MG/1
TABLET, FILM COATED ORAL
Qty: 6 TABLET | Refills: 0 | Status: SHIPPED | OUTPATIENT
Start: 2021-09-24 | End: 2021-12-10

## 2021-09-24 RX ORDER — METHYLPREDNISOLONE 4 MG/1
TABLET ORAL
Qty: 1 EACH | Refills: 0 | Status: SHIPPED | OUTPATIENT
Start: 2021-09-24 | End: 2021-12-10

## 2021-09-24 NOTE — PROGRESS NOTES
Chief Complaint   Patient presents with   • Sinus Problem     Left ear congestion, sinus congestion.       Subjective          Mahsa Spain presents to [facility]    Pt presents with sinus/nasal congestion, L ear pain, cough, sore throat x 1 week. Has taken OTC sinus medication to treat without success. Pt has had covid vaccine. Denies fever, chills, SOB, loss of taste or smell, N/V or other.     Pt c/o increased arthritis pain. Bilat hand arthritis. Reports no injuries or falls. Uses voltaren gel topical which helps some.       Past History:  Medical History: has a past medical history of Arthritis, Condition not found (06/12/2015), Hemorrhoids, HTN (hypertension), and Primary osteoarthritis of left knee (02/13/2018).   Surgical History: has a past surgical history that includes Colonoscopy; Hip fracture surgery (Right); Hysterectomy; Other surgical history; Other surgical history; Other surgical history; and Knee surgery (Right).   Family History: family history includes Arthritis in her brother and mother; Depression in her mother; Hypertension in her mother; Osteoarthritis in her mother; Osteoporosis in her mother.   Social History: reports that she has never smoked. She has never used smokeless tobacco. She reports current alcohol use. She reports that she does not use drugs.  Allergies: Patient has no known allergies.  (Not in a hospital admission)       Social History     Socioeconomic History   • Marital status:      Spouse name: Not on file   • Number of children: Not on file   • Years of education: Not on file   • Highest education level: Not on file   Tobacco Use   • Smoking status: Never Smoker   • Smokeless tobacco: Never Used   Vaping Use   • Vaping Use: Never used   Substance and Sexual Activity   • Alcohol use: Yes     Comment: Rarely drinks, less than 1 drink per day   • Drug use: Never   • Sexual activity: Defer       Health Maintenance Due   Topic Date  "Due   • ANNUAL PHYSICAL  Never done       Objective     Vital Signs:   /67   Pulse 81   Temp 98.2 °F (36.8 °C)   Resp 20   Ht 160 cm (63\")   Wt 74.8 kg (165 lb)   SpO2 98%   BMI 29.23 kg/m²       Physical Exam  Constitutional:       Appearance: Normal appearance.   HENT:      Head: Normocephalic and atraumatic.      Nose: Nose normal.   Eyes:      Conjunctiva/sclera: Conjunctivae normal.   Pulmonary:      Effort: Pulmonary effort is normal.   Neurological:      Mental Status: She is alert and oriented to person, place, and time.   Psychiatric:         Mood and Affect: Mood normal.         Behavior: Behavior normal.         Thought Content: Thought content normal.          Review of Systems   Constitutional: Negative for chills, fatigue and fever.   HENT: Positive for congestion, ear pain, sinus pressure and sore throat.    Eyes: Negative for blurred vision.   Respiratory: Positive for cough. Negative for shortness of breath.    Cardiovascular: Negative for chest pain, palpitations and leg swelling.   Gastrointestinal: Negative for abdominal pain, constipation, diarrhea, nausea, vomiting and GERD.   Musculoskeletal: Positive for arthralgias.   Skin: Negative for rash and skin lesions.   Neurological: Negative for dizziness and headache.   Psychiatric/Behavioral: Negative for sleep disturbance, suicidal ideas and depressed mood. The patient is not nervous/anxious.         Result Review :     Common labs    Common Labsle 4/12/21   Calcium 9.7                     Assessment and Plan    Diagnoses and all orders for this visit:    1. Otalgia, unspecified laterality (Primary)    2. Sinus pain    3. Acute non-recurrent maxillary sinusitis  Comments:  zpack UAD   Medrol dose yumiko UAD   Increase rest   increase clear fluid   Avoid allergy/sinus triggers     4. Arthritis  Comments:  Disc tx options   Start diclofenac 75mg PO bid PRN   DO not take with other NSAIDs  Continue voltaren PRN UAD           Follow Up   No " follow-ups on file.  Patient was given instructions and counseling regarding her condition or for health maintenance advice. Please see specific information pulled into the AVS if appropriate.

## 2021-11-10 ENCOUNTER — HOSPITAL ENCOUNTER (OUTPATIENT)
Dept: MAMMOGRAPHY | Facility: HOSPITAL | Age: 61
Discharge: HOME | End: 2021-11-10
Admitting: NURSE PRACTITIONER

## 2021-11-10 DIAGNOSIS — Z12.31 ENCOUNTER FOR SCREENING MAMMOGRAM FOR MALIGNANT NEOPLASM OF BREAST: ICD-10-CM

## 2021-11-10 PROCEDURE — 77067 SCR MAMMO BI INCL CAD: CPT

## 2021-11-10 PROCEDURE — 77063 BREAST TOMOSYNTHESIS BI: CPT

## 2021-11-19 RX ORDER — DICLOFENAC SODIUM 75 MG/1
TABLET, DELAYED RELEASE ORAL
Qty: 60 TABLET | Refills: 2 | Status: SHIPPED | OUTPATIENT
Start: 2021-11-19 | End: 2021-12-10 | Stop reason: SDUPTHER

## 2021-12-10 ENCOUNTER — OFFICE VISIT (OUTPATIENT)
Dept: FAMILY MEDICINE CLINIC | Facility: CLINIC | Age: 61
End: 2021-12-10

## 2021-12-10 VITALS
SYSTOLIC BLOOD PRESSURE: 143 MMHG | TEMPERATURE: 97.9 F | RESPIRATION RATE: 20 BRPM | DIASTOLIC BLOOD PRESSURE: 80 MMHG | WEIGHT: 175 LBS | HEIGHT: 63 IN | OXYGEN SATURATION: 99 % | HEART RATE: 78 BPM | BODY MASS INDEX: 31.01 KG/M2

## 2021-12-10 DIAGNOSIS — J32.9 SINUSITIS, UNSPECIFIED CHRONICITY, UNSPECIFIED LOCATION: Primary | ICD-10-CM

## 2021-12-10 DIAGNOSIS — I10 ESSENTIAL HYPERTENSION: ICD-10-CM

## 2021-12-10 DIAGNOSIS — E66.9 CLASS 1 OBESITY WITHOUT SERIOUS COMORBIDITY WITH BODY MASS INDEX (BMI) OF 31.0 TO 31.9 IN ADULT, UNSPECIFIED OBESITY TYPE: ICD-10-CM

## 2021-12-10 PROBLEM — E66.811 CLASS 1 OBESITY WITHOUT SERIOUS COMORBIDITY WITH BODY MASS INDEX (BMI) OF 31.0 TO 31.9 IN ADULT: Status: ACTIVE | Noted: 2021-12-10

## 2021-12-10 PROCEDURE — 99214 OFFICE O/P EST MOD 30 MIN: CPT | Performed by: NURSE PRACTITIONER

## 2021-12-10 RX ORDER — METOPROLOL SUCCINATE 25 MG/1
25 TABLET, EXTENDED RELEASE ORAL DAILY
Qty: 30 TABLET | Refills: 5 | Status: SHIPPED | OUTPATIENT
Start: 2021-12-10 | End: 2022-01-25 | Stop reason: SDUPTHER

## 2021-12-10 RX ORDER — CEFDINIR 300 MG/1
300 CAPSULE ORAL 2 TIMES DAILY
Qty: 14 CAPSULE | Refills: 0 | Status: SHIPPED | OUTPATIENT
Start: 2021-12-10 | End: 2021-12-13 | Stop reason: ALTCHOICE

## 2021-12-10 RX ORDER — METHYLPREDNISOLONE 4 MG/1
TABLET ORAL
Qty: 1 EACH | Refills: 0 | Status: SHIPPED | OUTPATIENT
Start: 2021-12-10 | End: 2022-01-25

## 2021-12-10 RX ORDER — DICLOFENAC SODIUM 75 MG/1
75 TABLET, DELAYED RELEASE ORAL 2 TIMES DAILY
Qty: 60 TABLET | Refills: 5 | Status: SHIPPED | OUTPATIENT
Start: 2021-12-10 | End: 2022-10-28 | Stop reason: ALTCHOICE

## 2021-12-10 NOTE — ASSESSMENT & PLAN NOTE
Patient's (Body mass index is 31 kg/m².) indicates that they are obese (BMI >30) with health conditions that include hypertension . Weight is unchanged. BMI is is above average; BMI management plan is completed. We discussed portion control and increasing exercise.

## 2021-12-10 NOTE — PATIENT INSTRUCTIONS
Sinusitis, Adult  Sinusitis is soreness and swelling (inflammation) of your sinuses. Sinuses are hollow spaces in the bones around your face. They are located:  · Around your eyes.  · In the middle of your forehead.  · Behind your nose.  · In your cheekbones.  Your sinuses and nasal passages are lined with a fluid called mucus. Mucus drains out of your sinuses. Swelling can trap mucus in your sinuses. This lets germs (bacteria, virus, or fungus) grow, which leads to infection. Most of the time, this condition is caused by a virus.  What are the causes?  This condition is caused by:  · Allergies.  · Asthma.  · Germs.  · Things that block your nose or sinuses.  · Growths in the nose (nasal polyps).  · Chemicals or irritants in the air.  · Fungus (rare).  What increases the risk?  You are more likely to develop this condition if:  · You have a weak body defense system (immune system).  · You do a lot of swimming or diving.  · You use nasal sprays too much.  · You smoke.  What are the signs or symptoms?  The main symptoms of this condition are pain and a feeling of pressure around the sinuses. Other symptoms include:  · Stuffy nose (congestion).  · Runny nose (drainage).  · Swelling and warmth in the sinuses.  · Headache.  · Toothache.  · A cough that may get worse at night.  · Mucus that collects in the throat or the back of the nose (postnasal drip).  · Being unable to smell and taste.  · Being very tired (fatigue).  · A fever.  · Sore throat.  · Bad breath.  How is this diagnosed?  This condition is diagnosed based on:  · Your symptoms.  · Your medical history.  · A physical exam.  · Tests to find out if your condition is short-term (acute) or long-term (chronic). Your doctor may:  ? Check your nose for growths (polyps).  ? Check your sinuses using a tool that has a light (endoscope).  ? Check for allergies or germs.  ? Do imaging tests, such as an MRI or CT scan.  How is this treated?  Treatment for this condition  depends on the cause and whether it is short-term or long-term.  · If caused by a virus, your symptoms should go away on their own within 10 days. You may be given medicines to relieve symptoms. They include:  ? Medicines that shrink swollen tissue in the nose.  ? Medicines that treat allergies (antihistamines).  ? A spray that treats swelling of the nostrils.   ? Rinses that help get rid of thick mucus in your nose (nasal saline washes).  · If caused by bacteria, your doctor may wait to see if you will get better without treatment. You may be given antibiotic medicine if you have:  ? A very bad infection.  ? A weak body defense system.  · If caused by growths in the nose, you may need to have surgery.  Follow these instructions at home:  Medicines  · Take, use, or apply over-the-counter and prescription medicines only as told by your doctor. These may include nasal sprays.  · If you were prescribed an antibiotic medicine, take it as told by your doctor. Do not stop taking the antibiotic even if you start to feel better.  Hydrate and humidify    · Drink enough water to keep your pee (urine) pale yellow.  · Use a cool mist humidifier to keep the humidity level in your home above 50%.  · Breathe in steam for 10-15 minutes, 3-4 times a day, or as told by your doctor. You can do this in the bathroom while a hot shower is running.  · Try not to spend time in cool or dry air.    Rest  · Rest as much as you can.  · Sleep with your head raised (elevated).  · Make sure you get enough sleep each night.  General instructions    · Put a warm, moist washcloth on your face 3-4 times a day, or as often as told by your doctor. This will help with discomfort.  · Wash your hands often with soap and water. If there is no soap and water, use hand .  · Do not smoke. Avoid being around people who are smoking (secondhand smoke).  · Keep all follow-up visits as told by your doctor. This is important.    Contact a doctor if:  · You  have a fever.  · Your symptoms get worse.  · Your symptoms do not get better within 10 days.  Get help right away if:  · You have a very bad headache.  · You cannot stop throwing up (vomiting).  · You have very bad pain or swelling around your face or eyes.  · You have trouble seeing.  · You feel confused.  · Your neck is stiff.  · You have trouble breathing.  Summary  · Sinusitis is swelling of your sinuses. Sinuses are hollow spaces in the bones around your face.  · This condition is caused by tissues in your nose that become inflamed or swollen. This traps germs. These can lead to infection.  · If you were prescribed an antibiotic medicine, take it as told by your doctor. Do not stop taking it even if you start to feel better.  · Keep all follow-up visits as told by your doctor. This is important.  This information is not intended to replace advice given to you by your health care provider. Make sure you discuss any questions you have with your health care provider.  Document Revised: 05/20/2019 Document Reviewed: 05/20/2019  [employer] Patient Education © 2021 [employer]

## 2021-12-10 NOTE — PROGRESS NOTES
"Chief Complaint   Patient presents with   • Sinusitis     Congestion x5 days.       Subjective          Mahsa Spain presents to [facility]    Pt presents with nasal congestion, sinus pain/pressure, HA, sore throat x 5-6 days. Has been taking coricidin HBP to treat. Denies fever, chills, SOB, myalgia, loss of taste or smell. Has had covid vaccine and booster. Tested for covid this AM at school, result negative.       Past History:  Medical History: has a past medical history of Arthritis, Condition not found (06/12/2015), Hemorrhoids, HTN (hypertension), and Primary osteoarthritis of left knee (02/13/2018).   Surgical History: has a past surgical history that includes Colonoscopy; Hip fracture surgery (Right); Hysterectomy; Other surgical history; Other surgical history; Other surgical history; and Knee surgery (Right).   Family History: family history includes Arthritis in her brother and mother; Depression in her mother; Hypertension in her mother; Osteoarthritis in her mother; Osteoporosis in her mother.   Social History: reports that she has never smoked. She has never used smokeless tobacco. She reports current alcohol use. She reports that she does not use drugs.  Allergies: Patient has no known allergies.  (Not in a hospital admission)       Social History     Socioeconomic History   • Marital status:    Tobacco Use   • Smoking status: Never Smoker   • Smokeless tobacco: Never Used   Vaping Use   • Vaping Use: Never used   Substance and Sexual Activity   • Alcohol use: Yes     Comment: Rarely drinks, less than 1 drink per day   • Drug use: Never   • Sexual activity: Defer       Health Maintenance Due   Topic Date Due   • ANNUAL PHYSICAL  Never done   • INFLUENZA VACCINE  08/01/2021   • COVID-19 Vaccine (3 - Booster for Moderna series) 09/03/2021       Objective     Vital Signs:   /80   Pulse 78   Temp 97.9 °F (36.6 °C)   Resp 20   Ht 160 cm (63\")   Wt " 79.4 kg (175 lb)   SpO2 99%   BMI 31.00 kg/m²       Physical Exam  Vitals reviewed.   Constitutional:       General: She is not in acute distress.     Appearance: Normal appearance.   HENT:      Head: Normocephalic.      Right Ear: Tympanic membrane normal.      Left Ear: Tympanic membrane normal.      Nose:      Right Sinus: Maxillary sinus tenderness present.      Left Sinus: Maxillary sinus tenderness present.      Mouth/Throat:      Pharynx: Oropharynx is clear. Posterior oropharyngeal erythema present.   Eyes:      General: No scleral icterus.     Extraocular Movements: Extraocular movements intact.      Conjunctiva/sclera: Conjunctivae normal.      Pupils: Pupils are equal, round, and reactive to light.   Cardiovascular:      Rate and Rhythm: Normal rate and regular rhythm.      Pulses: Normal pulses.      Heart sounds: Normal heart sounds.   Pulmonary:      Effort: Pulmonary effort is normal.      Breath sounds: Normal breath sounds.   Abdominal:      General: Bowel sounds are normal.      Palpations: Abdomen is soft.   Musculoskeletal:         General: Normal range of motion.      Cervical back: Neck supple.   Skin:     General: Skin is warm and dry.   Neurological:      Mental Status: She is alert and oriented to person, place, and time.   Psychiatric:         Mood and Affect: Mood normal.         Behavior: Behavior normal.         Thought Content: Thought content normal.         Judgment: Judgment normal.          Review of Systems   Constitutional: Negative for chills, fatigue and fever.   HENT: Positive for congestion, sinus pressure and sore throat. Negative for ear pain.    Eyes: Negative for blurred vision.   Respiratory: Negative for cough and shortness of breath.    Cardiovascular: Negative for chest pain, palpitations and leg swelling.   Gastrointestinal: Negative for abdominal pain, constipation, diarrhea, nausea, vomiting and GERD.   Musculoskeletal: Negative for arthralgias.   Skin: Negative  for rash and skin lesions.   Neurological: Positive for headache. Negative for dizziness.   Psychiatric/Behavioral: Negative for sleep disturbance, suicidal ideas and depressed mood. The patient is not nervous/anxious.         Result Review :     Common labs    Common Labsle 4/12/21   Calcium 9.7                     Assessment and Plan    Diagnoses and all orders for this visit:    1. Sinusitis, unspecified chronicity, unspecified location (Primary)    2. Essential hypertension  Comments:  BP elevated   States she is out of metoprolol, hasn't take for 2 weeks   Refilled medication   Take BP reg, keep log, bring to next appt   Assessment & Plan:  Hypertension is unchanged.  Continue current treatment regimen.  Dietary sodium restriction.  Weight loss.  Regular aerobic exercise.  Blood pressure will be reassessed at the next regular appointment.      3. Class 1 obesity without serious comorbidity with body mass index (BMI) of 31.0 to 31.9 in adult, unspecified obesity type  Assessment & Plan:  Patient's (Body mass index is 31 kg/m².) indicates that they are obese (BMI >30) with health conditions that include hypertension . Weight is unchanged. BMI is is above average; BMI management plan is completed. We discussed portion control and increasing exercise.       Other orders  -     metoprolol succinate XL (TOPROL-XL) 25 MG 24 hr tablet; Take 1 tablet by mouth Daily.  Dispense: 30 tablet; Refill: 5  -     diclofenac (VOLTAREN) 75 MG EC tablet; Take 1 tablet by mouth 2 (Two) Times a Day.  Dispense: 60 tablet; Refill: 5  -     cefdinir (OMNICEF) 300 MG capsule; Take 1 capsule by mouth 2 (Two) Times a Day for 7 days.  Dispense: 14 capsule; Refill: 0  -     methylPREDNISolone (MEDROL) 4 MG dose pack; Take as directed on package instructions.  Dispense: 1 each; Refill: 0        Follow Up   Return if symptoms worsen or fail to improve.  Patient was given instructions and counseling regarding her condition or for health  maintenance advice. Please see specific information pulled into the AVS if appropriate.

## 2021-12-13 ENCOUNTER — HOSPITAL ENCOUNTER (OUTPATIENT)
Dept: BONE DENSITY | Facility: HOSPITAL | Age: 61
Discharge: HOME | End: 2021-12-13
Admitting: INTERNAL MEDICINE

## 2021-12-13 ENCOUNTER — TELEPHONE (OUTPATIENT)
Dept: FAMILY MEDICINE CLINIC | Facility: CLINIC | Age: 61
End: 2021-12-13

## 2021-12-13 DIAGNOSIS — M81.0 OSTEOPOROSIS, POST-MENOPAUSAL: ICD-10-CM

## 2021-12-13 PROCEDURE — 77080 DXA BONE DENSITY AXIAL: CPT

## 2021-12-13 RX ORDER — AMOXICILLIN 500 MG/1
500 CAPSULE ORAL 2 TIMES DAILY
Qty: 14 CAPSULE | Refills: 0 | Status: SHIPPED | OUTPATIENT
Start: 2021-12-13 | End: 2021-12-20

## 2021-12-13 NOTE — TELEPHONE ENCOUNTER
Caller: Mahsa Spain    Relationship: Self    Best call back number: [phone]  BEST TIME TO REACH HER IS BEFORE 10:45 AM THIS MORNING.  OK TO LEAVE VOICEMAIL MESSAGE.    What medication are you requesting: AMOXICILLIN    What are your current symptoms:DIARRHEA FROM THE ANIBIOTIC    How long have you been experiencing symptoms: A COUPLE OF DAYS     Have you had these symptoms before:    [x] Yes  [] No    Have you been treated for these symptoms before:   [x] Yes  [] No    If a prescription is needed, what is your preferred pharmacy and phone number:  [id number] [address] AT [facility] [address] - [phone] [facility] [phone]   [phone]      Additional notes:PATIENT STATED THAT THE ANTIBIOTIC SHE WAS PRESCRIBED HAS CAUSED HER DIARRHEA AND WOULD LIKE FOR TOM TO CALL HER IN SOME AMOXICILLIN INSTEAD.  THIS IS THE ANTIBIOTIC SHE CAN TOLERATE.

## 2022-01-10 ENCOUNTER — LAB (OUTPATIENT)
Dept: LAB | Facility: HOSPITAL | Age: 62
End: 2022-01-10

## 2022-01-10 ENCOUNTER — TRANSCRIBE ORDERS (OUTPATIENT)
Dept: LAB | Facility: HOSPITAL | Age: 62
End: 2022-01-10

## 2022-01-10 DIAGNOSIS — Z79.899 ENCOUNTER FOR LONG-TERM (CURRENT) USE OF OTHER MEDICATIONS: ICD-10-CM

## 2022-01-10 DIAGNOSIS — M85.89 DISAPPEARING BONE DISEASE: Primary | ICD-10-CM

## 2022-01-10 DIAGNOSIS — M85.89 DISAPPEARING BONE DISEASE: ICD-10-CM

## 2022-01-10 LAB
25(OH)D3 SERPL-MCNC: 49.5 NG/ML (ref 30–100)
CALCIUM SPEC-SCNC: 10 MG/DL (ref 8.6–10.5)
CREAT SERPL-MCNC: 0.8 MG/DL (ref 0.57–1)
GFR SERPL CREATININE-BSD FRML MDRD: 73 ML/MIN/1.73

## 2022-01-10 PROCEDURE — 82565 ASSAY OF CREATININE: CPT

## 2022-01-10 PROCEDURE — 82310 ASSAY OF CALCIUM: CPT

## 2022-01-10 PROCEDURE — 82306 VITAMIN D 25 HYDROXY: CPT

## 2022-01-10 PROCEDURE — 36415 COLL VENOUS BLD VENIPUNCTURE: CPT

## 2022-01-25 ENCOUNTER — LAB (OUTPATIENT)
Dept: LAB | Facility: HOSPITAL | Age: 62
End: 2022-01-25

## 2022-01-25 ENCOUNTER — OFFICE VISIT (OUTPATIENT)
Dept: FAMILY MEDICINE CLINIC | Facility: CLINIC | Age: 62
End: 2022-01-25

## 2022-01-25 VITALS
BODY MASS INDEX: 31.01 KG/M2 | SYSTOLIC BLOOD PRESSURE: 174 MMHG | HEART RATE: 92 BPM | WEIGHT: 175 LBS | OXYGEN SATURATION: 99 % | RESPIRATION RATE: 20 BRPM | TEMPERATURE: 97 F | HEIGHT: 63 IN | DIASTOLIC BLOOD PRESSURE: 90 MMHG

## 2022-01-25 DIAGNOSIS — F41.9 ANXIETY: ICD-10-CM

## 2022-01-25 DIAGNOSIS — R73.03 PREDIABETES: ICD-10-CM

## 2022-01-25 DIAGNOSIS — R00.2 PALPITATIONS: ICD-10-CM

## 2022-01-25 DIAGNOSIS — Z13.220 SCREENING FOR LIPID DISORDERS: ICD-10-CM

## 2022-01-25 DIAGNOSIS — I10 ESSENTIAL HYPERTENSION: Primary | ICD-10-CM

## 2022-01-25 DIAGNOSIS — E66.9 CLASS 1 OBESITY WITHOUT SERIOUS COMORBIDITY WITH BODY MASS INDEX (BMI) OF 31.0 TO 31.9 IN ADULT, UNSPECIFIED OBESITY TYPE: ICD-10-CM

## 2022-01-25 DIAGNOSIS — Z13.29 SCREENING FOR THYROID DISORDER: ICD-10-CM

## 2022-01-25 DIAGNOSIS — G47.00 INSOMNIA, UNSPECIFIED TYPE: ICD-10-CM

## 2022-01-25 DIAGNOSIS — I10 ESSENTIAL HYPERTENSION: ICD-10-CM

## 2022-01-25 PROBLEM — F32.A DEPRESSION: Status: RESOLVED | Noted: 2022-01-25 | Resolved: 2022-01-25

## 2022-01-25 PROBLEM — F32.A DEPRESSION: Status: ACTIVE | Noted: 2022-01-25

## 2022-01-25 LAB
ALBUMIN SERPL-MCNC: 4.7 G/DL (ref 3.5–5.2)
ALBUMIN/GLOB SERPL: 1.5 G/DL
ALP SERPL-CCNC: 75 U/L (ref 39–117)
ALT SERPL W P-5'-P-CCNC: 31 U/L (ref 1–33)
ANION GAP SERPL CALCULATED.3IONS-SCNC: 11.5 MMOL/L (ref 5–15)
AST SERPL-CCNC: 23 U/L (ref 1–32)
BASOPHILS # BLD AUTO: 0.02 10*3/MM3 (ref 0–0.2)
BASOPHILS NFR BLD AUTO: 0.2 % (ref 0–1.5)
BILIRUB SERPL-MCNC: 0.3 MG/DL (ref 0–1.2)
BUN SERPL-MCNC: 7 MG/DL (ref 8–23)
BUN/CREAT SERPL: 8.8 (ref 7–25)
CALCIUM SPEC-SCNC: 10.1 MG/DL (ref 8.6–10.5)
CHLORIDE SERPL-SCNC: 96 MMOL/L (ref 98–107)
CHOLEST SERPL-MCNC: 224 MG/DL (ref 0–200)
CO2 SERPL-SCNC: 29.5 MMOL/L (ref 22–29)
CREAT SERPL-MCNC: 0.8 MG/DL (ref 0.57–1)
DEPRECATED RDW RBC AUTO: 39.3 FL (ref 37–54)
EOSINOPHIL # BLD AUTO: 0.06 10*3/MM3 (ref 0–0.4)
EOSINOPHIL NFR BLD AUTO: 0.7 % (ref 0.3–6.2)
ERYTHROCYTE [DISTWIDTH] IN BLOOD BY AUTOMATED COUNT: 12.7 % (ref 12.3–15.4)
GFR SERPL CREATININE-BSD FRML MDRD: 73 ML/MIN/1.73
GLOBULIN UR ELPH-MCNC: 3.1 GM/DL
GLUCOSE SERPL-MCNC: 109 MG/DL (ref 65–99)
HBA1C MFR BLD: 7.22 % (ref 4.8–5.6)
HCT VFR BLD AUTO: 42.7 % (ref 34–46.6)
HDLC SERPL-MCNC: 46 MG/DL (ref 40–60)
HGB BLD-MCNC: 14.2 G/DL (ref 12–15.9)
IMM GRANULOCYTES # BLD AUTO: 0.02 10*3/MM3 (ref 0–0.05)
IMM GRANULOCYTES NFR BLD AUTO: 0.2 % (ref 0–0.5)
LDLC SERPL CALC-MCNC: 154 MG/DL (ref 0–100)
LDLC/HDLC SERPL: 3.29 {RATIO}
LYMPHOCYTES # BLD AUTO: 1.27 10*3/MM3 (ref 0.7–3.1)
LYMPHOCYTES NFR BLD AUTO: 14.9 % (ref 19.6–45.3)
MCH RBC QN AUTO: 28.7 PG (ref 26.6–33)
MCHC RBC AUTO-ENTMCNC: 33.3 G/DL (ref 31.5–35.7)
MCV RBC AUTO: 86.3 FL (ref 79–97)
MONOCYTES # BLD AUTO: 0.48 10*3/MM3 (ref 0.1–0.9)
MONOCYTES NFR BLD AUTO: 5.6 % (ref 5–12)
NEUTROPHILS NFR BLD AUTO: 6.67 10*3/MM3 (ref 1.7–7)
NEUTROPHILS NFR BLD AUTO: 78.4 % (ref 42.7–76)
NRBC BLD AUTO-RTO: 0 /100 WBC (ref 0–0.2)
PLATELET # BLD AUTO: 406 10*3/MM3 (ref 140–450)
PMV BLD AUTO: 9.3 FL (ref 6–12)
POTASSIUM SERPL-SCNC: 4 MMOL/L (ref 3.5–5.2)
PROT SERPL-MCNC: 7.8 G/DL (ref 6–8.5)
RBC # BLD AUTO: 4.95 10*6/MM3 (ref 3.77–5.28)
SODIUM SERPL-SCNC: 137 MMOL/L (ref 136–145)
TRIGL SERPL-MCNC: 133 MG/DL (ref 0–150)
TSH SERPL DL<=0.05 MIU/L-ACNC: 0.31 UIU/ML (ref 0.27–4.2)
VLDLC SERPL-MCNC: 24 MG/DL (ref 5–40)
WBC NRBC COR # BLD: 8.52 10*3/MM3 (ref 3.4–10.8)

## 2022-01-25 PROCEDURE — 99214 OFFICE O/P EST MOD 30 MIN: CPT | Performed by: NURSE PRACTITIONER

## 2022-01-25 PROCEDURE — 93005 ELECTROCARDIOGRAM TRACING: CPT | Performed by: NURSE PRACTITIONER

## 2022-01-25 PROCEDURE — 80050 GENERAL HEALTH PANEL: CPT

## 2022-01-25 PROCEDURE — 80061 LIPID PANEL: CPT

## 2022-01-25 PROCEDURE — 83036 HEMOGLOBIN GLYCOSYLATED A1C: CPT

## 2022-01-25 PROCEDURE — 36415 COLL VENOUS BLD VENIPUNCTURE: CPT

## 2022-01-25 RX ORDER — METOPROLOL SUCCINATE 50 MG/1
50 TABLET, EXTENDED RELEASE ORAL DAILY
Qty: 30 TABLET | Refills: 5 | Status: SHIPPED | OUTPATIENT
Start: 2022-01-25 | End: 2022-04-25

## 2022-01-25 RX ORDER — BUSPIRONE HYDROCHLORIDE 5 MG/1
5 TABLET ORAL 3 TIMES DAILY
Qty: 90 TABLET | Refills: 5 | Status: SHIPPED | OUTPATIENT
Start: 2022-01-25 | End: 2022-04-25

## 2022-01-25 RX ORDER — HYDROXYZINE 50 MG/1
50 TABLET, FILM COATED ORAL NIGHTLY PRN
Qty: 30 TABLET | Refills: 5 | Status: SHIPPED | OUTPATIENT
Start: 2022-01-25 | End: 2022-04-28

## 2022-01-25 NOTE — ASSESSMENT & PLAN NOTE
Discussed tx options   Start hydroxyzine 50mg PO qhs PRN   Discussed proper sleep hygiene  FU 1 month

## 2022-01-25 NOTE — ASSESSMENT & PLAN NOTE
Hypertension is worsening.  Medication changes per orders.  Ambulatory blood pressure monitoring.  Blood pressure will be reassessed in 4 weeks.

## 2022-01-25 NOTE — PROGRESS NOTES
Chief Complaint   Patient presents with   • Hypertension   • Anxiety       Subjective          Mahsa Spain presents to [facility]    Pt presents c/o increased BP. Todays /90. States systolic 158 yesterday. Believes this may be due to stress/anxiety. Takes lisinopril/hztz and metoprolol faithfully. Does not take anything for anxiety. States she has been under extra stress lately with family issues. Also having trouble staying asleep at night. C/o chest heaviness and intermittent palpitations.     Reviewed all recent labs and medications. Due for labs, will order.       Past History:  Medical History: has a past medical history of Arthritis, Condition not found (06/12/2015), Hemorrhoids, HTN (hypertension), and Primary osteoarthritis of left knee (02/13/2018).   Surgical History: has a past surgical history that includes Colonoscopy; Hip fracture surgery (Right); Hysterectomy; Other surgical history; Other surgical history; Other surgical history; and Knee surgery (Right).   Family History: family history includes Arthritis in her brother and mother; Depression in her mother; Hypertension in her mother; Osteoarthritis in her mother; Osteoporosis in her mother.   Social History: reports that she has never smoked. She has never used smokeless tobacco. She reports current alcohol use. She reports that she does not use drugs.  Allergies: Patient has no known allergies.  (Not in a hospital admission)       Social History     Socioeconomic History   • Marital status:    Tobacco Use   • Smoking status: Never Smoker   • Smokeless tobacco: Never Used   Vaping Use   • Vaping Use: Never used   Substance and Sexual Activity   • Alcohol use: Yes     Comment: Rarely drinks, less than 1 drink per day   • Drug use: Never   • Sexual activity: Defer       Health Maintenance Due   Topic Date Due   • ANNUAL PHYSICAL  Never done       Objective     Vital Signs:   /90   Pulse 92    "Temp 97 °F (36.1 °C)   Resp 20   Ht 160 cm (63\")   Wt 79.4 kg (175 lb)   SpO2 99%   BMI 31.00 kg/m²       Physical Exam  Vitals reviewed.   Constitutional:       General: She is not in acute distress.     Appearance: She is obese.   HENT:      Head: Normocephalic.      Right Ear: Tympanic membrane normal.      Left Ear: Tympanic membrane normal.      Nose: Nose normal.      Mouth/Throat:      Pharynx: Oropharynx is clear. No posterior oropharyngeal erythema.   Eyes:      General: No scleral icterus.     Extraocular Movements: Extraocular movements intact.      Conjunctiva/sclera: Conjunctivae normal.      Pupils: Pupils are equal, round, and reactive to light.   Cardiovascular:      Rate and Rhythm: Normal rate and regular rhythm.      Pulses: Normal pulses.      Heart sounds: Normal heart sounds.   Pulmonary:      Effort: Pulmonary effort is normal.      Breath sounds: Normal breath sounds.   Abdominal:      General: Bowel sounds are normal.      Palpations: Abdomen is soft.   Musculoskeletal:         General: Normal range of motion.      Cervical back: Neck supple.   Skin:     General: Skin is warm and dry.   Neurological:      Mental Status: She is alert and oriented to person, place, and time.   Psychiatric:         Mood and Affect: Mood normal.         Behavior: Behavior normal.         Thought Content: Thought content normal.         Judgment: Judgment normal.          Review of Systems   Constitutional: Negative for chills, fatigue and fever.   HENT: Negative for congestion, ear pain, sinus pressure and sore throat.    Eyes: Negative for blurred vision.   Respiratory: Negative for cough and shortness of breath.    Cardiovascular: Positive for palpitations. Negative for chest pain and leg swelling.   Gastrointestinal: Negative for abdominal pain, constipation, diarrhea, nausea, vomiting and GERD.   Musculoskeletal: Negative for arthralgias.   Skin: Negative for rash and skin lesions.   Neurological: " Negative for dizziness and headache.   Psychiatric/Behavioral: Positive for sleep disturbance. Negative for suicidal ideas and depressed mood. The patient is nervous/anxious.         Result Review :     Common labs    Common Labsle 4/12/21 1/10/22 1/10/22     0919 0919   Creatinine   0.80   eGFR Non African Am   73   Calcium 9.7 10.0                      Assessment and Plan    Diagnoses and all orders for this visit:    1. Essential hypertension (Primary)  Assessment & Plan:  Hypertension is worsening.  Medication changes per orders.  Ambulatory blood pressure monitoring.  Blood pressure will be reassessed in 4 weeks.    Orders:  -     Comprehensive Metabolic Panel; Future  -     CBC & Differential; Future    2. Class 1 obesity without serious comorbidity with body mass index (BMI) of 31.0 to 31.9 in adult, unspecified obesity type  Assessment & Plan:  Patient's (Body mass index is 31 kg/m².) indicates that they are obese (BMI >30) with health conditions that include hypertension . Weight is unchanged. BMI is is above average; BMI management plan is completed. We discussed portion control and increasing exercise.       3. Anxiety  Assessment & Plan:  Discussed tx options   Start buspar 5mg PO tid   Fu 1 month       4. Prediabetes  Assessment & Plan:  Order for A1c   Low carb/low sugar diet     Orders:  -     Hemoglobin A1c; Future    5. Screening for thyroid disorder  -     TSH; Future    6. Screening for lipid disorders  -     Lipid Panel; Future    7. Palpitations  Comments:  EKG in office WNL   Consider holter, cardiology consult   labs today   Proceed to ED if S/S worsen or become severe   Orders:  -     ECG 12 Lead    8. Insomnia, unspecified type  Assessment & Plan:  Discussed tx options   Start hydroxyzine 50mg PO qhs PRN   Discussed proper sleep hygiene  FU 1 month       Other orders  -     metoprolol succinate XL (TOPROL-XL) 50 MG 24 hr tablet; Take 1 tablet by mouth Daily.  Dispense: 30 tablet; Refill:  5  -     hydrOXYzine (ATARAX) 50 MG tablet; Take 1 tablet by mouth At Night As Needed for Anxiety (sleep).  Dispense: 30 tablet; Refill: 5  -     busPIRone (BUSPAR) 5 MG tablet; Take 1 tablet by mouth 3 (Three) Times a Day.  Dispense: 90 tablet; Refill: 5        Follow Up   Return in about 4 weeks (around 2/22/2022), or if symptoms worsen or fail to improve.  Patient was given instructions and counseling regarding her condition or for health maintenance advice. Please see specific information pulled into the AVS if appropriate.

## 2022-01-26 ENCOUNTER — TELEPHONE (OUTPATIENT)
Dept: FAMILY MEDICINE CLINIC | Facility: CLINIC | Age: 62
End: 2022-01-26

## 2022-01-26 DIAGNOSIS — E11.65 TYPE 2 DIABETES MELLITUS WITH HYPERGLYCEMIA, WITHOUT LONG-TERM CURRENT USE OF INSULIN: Primary | ICD-10-CM

## 2022-01-26 RX ORDER — METFORMIN HYDROCHLORIDE 500 MG/1
500 TABLET, EXTENDED RELEASE ORAL
Qty: 30 TABLET | Refills: 5 | Status: SHIPPED | OUTPATIENT
Start: 2022-01-26 | End: 2022-04-26

## 2022-01-26 RX ORDER — ESCITALOPRAM OXALATE 10 MG/1
10 TABLET ORAL DAILY
Qty: 30 TABLET | Refills: 2 | Status: SHIPPED | OUTPATIENT
Start: 2022-01-26 | End: 2022-04-25

## 2022-01-26 RX ORDER — ATORVASTATIN CALCIUM 10 MG/1
10 TABLET, FILM COATED ORAL DAILY
Qty: 30 TABLET | Refills: 5 | Status: SHIPPED | OUTPATIENT
Start: 2022-01-26 | End: 2022-04-26

## 2022-01-26 NOTE — TELEPHONE ENCOUNTER
----- Message from MANNY Donis sent at 1/26/2022  2:22 PM EST -----  A1c is elevated to the point of T2DM. Also cholesterol is elevated. Start metformin 500mg PO qd and atorvastatin 10mg PO qd. Follow low carb/low sugar/low cholesterol diet. Placed order to recheck A1c and lipids in 3 months.

## 2022-01-26 NOTE — TELEPHONE ENCOUNTER
Caller: Mahsa Spain    Relationship: Self    Best call back number: [phone]    What medications are you currently taking:   Current Outpatient Medications on File Prior to Visit   Medication Sig Dispense Refill   • busPIRone (BUSPAR) 5 MG tablet Take 1 tablet by mouth 3 (Three) Times a Day. 90 tablet 5   • diclofenac (VOLTAREN) 75 MG EC tablet Take 1 tablet by mouth 2 (Two) Times a Day. 60 tablet 5   • hydrOXYzine (ATARAX) 50 MG tablet Take 1 tablet by mouth At Night As Needed for Anxiety (sleep). 30 tablet 5   • lisinopril-hydrochlorothiazide (PRINZIDE,ZESTORETIC) 20-25 MG per tablet TAKE 1 TABLET BY MOUTH ONCE DAILY 90 tablet 1   • metoprolol succinate XL (TOPROL-XL) 50 MG 24 hr tablet Take 1 tablet by mouth Daily. 30 tablet 5     No current facility-administered medications on file prior to visit.        What are your concerns: PATIENT STATES THAT SHE WAS SEEN YESTERDAY AND PRESCRIBED MEDICATION FOR ANXIETY. PATIENT IS REQUESTING THAT SHE BE PRESCRIBED LEXAPRO TO HELP WITH ANXIETY INSTEAD. PLEASE ADVISE PATIENT.       [id number] - [address] [facility] [address] - [phone]  - [phone]   [phone]

## 2022-02-13 ENCOUNTER — HOSPITAL ENCOUNTER (EMERGENCY)
Facility: HOSPITAL | Age: 62
Discharge: HOME | End: 2022-02-13
Attending: EMERGENCY MEDICINE | Admitting: EMERGENCY MEDICINE

## 2022-02-13 VITALS
OXYGEN SATURATION: 97 % | DIASTOLIC BLOOD PRESSURE: 75 MMHG | BODY MASS INDEX: 30.39 KG/M2 | WEIGHT: 171.52 LBS | SYSTOLIC BLOOD PRESSURE: 135 MMHG | HEART RATE: 70 BPM | HEIGHT: 63 IN | TEMPERATURE: 96 F | RESPIRATION RATE: 16 BRPM

## 2022-02-13 DIAGNOSIS — I10 PRIMARY HYPERTENSION: ICD-10-CM

## 2022-02-13 DIAGNOSIS — R53.83 MALAISE AND FATIGUE: Primary | ICD-10-CM

## 2022-02-13 DIAGNOSIS — E11.9 TYPE 2 DIABETES MELLITUS WITHOUT COMPLICATION, WITHOUT LONG-TERM CURRENT USE OF INSULIN: ICD-10-CM

## 2022-02-13 DIAGNOSIS — R53.81 MALAISE AND FATIGUE: Primary | ICD-10-CM

## 2022-02-13 LAB
ALBUMIN SERPL-MCNC: 4.5 G/DL (ref 3.5–5.2)
ALBUMIN/GLOB SERPL: 1.3 G/DL
ALP SERPL-CCNC: 70 U/L (ref 39–117)
ALT SERPL W P-5'-P-CCNC: 22 U/L (ref 1–33)
ANION GAP SERPL CALCULATED.3IONS-SCNC: 12.1 MMOL/L (ref 5–15)
AST SERPL-CCNC: 21 U/L (ref 1–32)
BACTERIA UR QL AUTO: ABNORMAL /HPF
BASOPHILS # BLD AUTO: 0.01 10*3/MM3 (ref 0–0.2)
BASOPHILS NFR BLD AUTO: 0.1 % (ref 0–1.5)
BILIRUB SERPL-MCNC: 0.4 MG/DL (ref 0–1.2)
BILIRUB UR QL STRIP: NEGATIVE
BUN SERPL-MCNC: 10 MG/DL (ref 8–23)
BUN/CREAT SERPL: 12.8 (ref 7–25)
CALCIUM SPEC-SCNC: 10 MG/DL (ref 8.6–10.5)
CHLORIDE SERPL-SCNC: 98 MMOL/L (ref 98–107)
CLARITY UR: ABNORMAL
CO2 SERPL-SCNC: 27.9 MMOL/L (ref 22–29)
COLOR UR: YELLOW
CREAT SERPL-MCNC: 0.78 MG/DL (ref 0.57–1)
DEPRECATED RDW RBC AUTO: 37.9 FL (ref 37–54)
EOSINOPHIL # BLD AUTO: 0.1 10*3/MM3 (ref 0–0.4)
EOSINOPHIL NFR BLD AUTO: 1.1 % (ref 0.3–6.2)
ERYTHROCYTE [DISTWIDTH] IN BLOOD BY AUTOMATED COUNT: 12.3 % (ref 12.3–15.4)
GFR SERPL CREATININE-BSD FRML MDRD: 75 ML/MIN/1.73
GLOBULIN UR ELPH-MCNC: 3.4 GM/DL
GLUCOSE SERPL-MCNC: 152 MG/DL (ref 65–99)
GLUCOSE UR STRIP-MCNC: NEGATIVE MG/DL
HCT VFR BLD AUTO: 39.7 % (ref 34–46.6)
HGB BLD-MCNC: 14.1 G/DL (ref 12–15.9)
HGB UR QL STRIP.AUTO: ABNORMAL
HOLD SPECIMEN: NORMAL
HOLD SPECIMEN: NORMAL
HYALINE CASTS UR QL AUTO: ABNORMAL /LPF
IMM GRANULOCYTES # BLD AUTO: 0.02 10*3/MM3 (ref 0–0.05)
IMM GRANULOCYTES NFR BLD AUTO: 0.2 % (ref 0–0.5)
KETONES UR QL STRIP: NEGATIVE
LEUKOCYTE ESTERASE UR QL STRIP.AUTO: ABNORMAL
LYMPHOCYTES # BLD AUTO: 1.76 10*3/MM3 (ref 0.7–3.1)
LYMPHOCYTES NFR BLD AUTO: 19.7 % (ref 19.6–45.3)
MAGNESIUM SERPL-MCNC: 1.9 MG/DL (ref 1.6–2.4)
MCH RBC QN AUTO: 30.3 PG (ref 26.6–33)
MCHC RBC AUTO-ENTMCNC: 35.5 G/DL (ref 31.5–35.7)
MCV RBC AUTO: 85.2 FL (ref 79–97)
MONOCYTES # BLD AUTO: 0.53 10*3/MM3 (ref 0.1–0.9)
MONOCYTES NFR BLD AUTO: 5.9 % (ref 5–12)
NEUTROPHILS NFR BLD AUTO: 6.52 10*3/MM3 (ref 1.7–7)
NEUTROPHILS NFR BLD AUTO: 73 % (ref 42.7–76)
NITRITE UR QL STRIP: NEGATIVE
NRBC BLD AUTO-RTO: 0 /100 WBC (ref 0–0.2)
PH UR STRIP.AUTO: 6.5 [PH] (ref 5–8)
PLATELET # BLD AUTO: 361 10*3/MM3 (ref 140–450)
PMV BLD AUTO: 8.6 FL (ref 6–12)
POTASSIUM SERPL-SCNC: 3.6 MMOL/L (ref 3.5–5.2)
PROT SERPL-MCNC: 7.9 G/DL (ref 6–8.5)
PROT UR QL STRIP: NEGATIVE
RBC # BLD AUTO: 4.66 10*6/MM3 (ref 3.77–5.28)
RBC # UR STRIP: ABNORMAL /HPF
REF LAB TEST METHOD: ABNORMAL
SODIUM SERPL-SCNC: 138 MMOL/L (ref 136–145)
SP GR UR STRIP: 1.02 (ref 1–1.03)
SQUAMOUS #/AREA URNS HPF: ABNORMAL /HPF
TSH SERPL DL<=0.05 MIU/L-ACNC: 0.22 UIU/ML (ref 0.27–4.2)
UROBILINOGEN UR QL STRIP: ABNORMAL
WBC # UR STRIP: ABNORMAL /HPF
WBC NRBC COR # BLD: 8.94 10*3/MM3 (ref 3.4–10.8)
WHOLE BLOOD HOLD SPECIMEN: NORMAL
WHOLE BLOOD HOLD SPECIMEN: NORMAL

## 2022-02-13 PROCEDURE — 83735 ASSAY OF MAGNESIUM: CPT | Performed by: EMERGENCY MEDICINE

## 2022-02-13 PROCEDURE — 36415 COLL VENOUS BLD VENIPUNCTURE: CPT

## 2022-02-13 PROCEDURE — 87086 URINE CULTURE/COLONY COUNT: CPT | Performed by: EMERGENCY MEDICINE

## 2022-02-13 PROCEDURE — 80050 GENERAL HEALTH PANEL: CPT | Performed by: EMERGENCY MEDICINE

## 2022-02-13 PROCEDURE — 99283 EMERGENCY DEPT VISIT LOW MDM: CPT

## 2022-02-13 PROCEDURE — 81001 URINALYSIS AUTO W/SCOPE: CPT | Performed by: EMERGENCY MEDICINE

## 2022-02-13 NOTE — ED PROVIDER NOTES
Time: 06:15 EST  Arrived by: car  Chief Complaint: hypertension  History provided by: patient  History is limited by: N/A     History of Present Illness:  Patient is a 61 y.o. year old female that presents to the emergency department for evaluation of HYPERTENSION. Patient states she hasn't fell well and bought an at home blood pressure monitor. She states she took her blood pressure yesterday and it was 180's systolic. In triage, patient's blood pressure was 182/72.      She reports she followed up with her PCP about 1 week ago and was told she had high cholesterol and type 2 diabetes. She states was placed on Crestor and metformin. Her metoprolol was increased and her lisinopril/HCTZ pill was changed. Patient states she usually takes her blood pressure around 0700 am and has not taken them today.     Patient lives on her own. She states she has also been stressed because her mother is currently in a nursing home.      HPI       Patient Care Team  Primary Care Provider: Celsa Herndon APRN    Past Medical History:     No Known Allergies  Past Medical History:   Diagnosis Date   • Arthritis    • Condition not found 06/12/2015    Right Total Knee   • Diabetes mellitus (HCC)    • Hemorrhoids    • HTN (hypertension)    • Hyperlipidemia    • Primary osteoarthritis of left knee 02/13/2018     Past Surgical History:   Procedure Laterality Date   • COLONOSCOPY     • HIP FRACTURE SURGERY Right     Repair   • HYSTERECTOMY     • KNEE SURGERY Right     scope   • OTHER SURGICAL HISTORY      Joint Surgery   • OTHER SURGICAL HISTORY      Metal Implants   • OTHER SURGICAL HISTORY      Artificial Joinys/Limb     Family History   Problem Relation Age of Onset   • Osteoarthritis Mother    • Depression Mother    • Hypertension Mother    • Arthritis Mother    • Osteoporosis Mother    • Arthritis Brother        Home Medications:  Prior to Admission medications    Medication Sig Start Date End Date Taking? Authorizing Provider  "  atorvastatin (LIPITOR) 10 MG tablet Take 1 tablet by mouth Daily. 1/26/22   Celsa Herndon APRN   busPIRone (BUSPAR) 5 MG tablet Take 1 tablet by mouth 3 (Three) Times a Day. 1/25/22   Celsa Herndon APRN   diclofenac (VOLTAREN) 75 MG EC tablet Take 1 tablet by mouth 2 (Two) Times a Day. 12/10/21   Celsa Herndon APRN   escitalopram (Lexapro) 10 MG tablet Take 1 tablet by mouth Daily. 1/26/22   Celsa Herndon APRN   hydrOXYzine (ATARAX) 50 MG tablet Take 1 tablet by mouth At Night As Needed for Anxiety (sleep). 1/25/22   Celsa Herndon APRN   lisinopril-hydrochlorothiazide (PRINZIDE,ZESTORETIC) 20-25 MG per tablet TAKE 1 TABLET BY MOUTH ONCE DAILY 8/24/21   Celsa Herndon APRN   metFORMIN ER (Glucophage XR) 500 MG 24 hr tablet Take 1 tablet by mouth Daily With Breakfast. 1/26/22   Celsa Herndon APRN   metoprolol succinate XL (TOPROL-XL) 50 MG 24 hr tablet Take 1 tablet by mouth Daily. 1/25/22   Celsa Herndon APRN        Social History:   Social History     Tobacco Use   • Smoking status: Never Smoker   • Smokeless tobacco: Never Used   Vaping Use   • Vaping Use: Never used   Substance Use Topics   • Alcohol use: Yes     Comment: Rarely drinks,    • Drug use: Never       Review of Systems:  Review of Systems   Constitutional: Negative for chills and fever.   HENT: Negative for ear discharge.    Eyes: Negative for photophobia.   Respiratory: Negative for cough and shortness of breath.    Cardiovascular: Negative for chest pain.   Gastrointestinal: Negative for abdominal pain, diarrhea, nausea and vomiting.   Genitourinary: Negative for dysuria.   Musculoskeletal: Negative for back pain and neck pain.   Skin: Negative for rash.   Neurological: Negative for headaches.        Physical Exam:  /76   Pulse 79   Temp 96 °F (35.6 °C) (Oral)   Resp 16   Ht 160 cm (63\")   Wt 77.8 kg (171 lb 8.3 oz)   SpO2 97%   BMI 30.38 kg/m² "     Physical Exam  Vitals and nursing note reviewed.   Constitutional:       General: She is not in acute distress.  HENT:      Head: Normocephalic and atraumatic.   Cardiovascular:      Rate and Rhythm: Normal rate and regular rhythm.      Heart sounds: No murmur heard.      Pulmonary:      Effort: No respiratory distress.      Breath sounds: Normal breath sounds.   Abdominal:      Palpations: Abdomen is soft.      Tenderness: There is no abdominal tenderness.   Musculoskeletal:      Cervical back: Neck supple.   Skin:     General: Skin is warm and dry.      Findings: No rash.   Neurological:      General: No focal deficit present.      Mental Status: She is alert and oriented to person, place, and time.   Psychiatric:         Mood and Affect: Mood is anxious.                Medications in the Emergency Department:  Medications - No data to display     Labs  Lab Results (last 24 hours)     Procedure Component Value Units Date/Time    Comprehensive Metabolic Panel [[id number]]  (Abnormal) Collected: 02/13/22 0612    Specimen: Blood Updated: 02/13/22 0637     Glucose 152 mg/dL      BUN 10 mg/dL      Creatinine 0.78 mg/dL      Sodium 138 mmol/L      Potassium 3.6 mmol/L      Chloride 98 mmol/L      CO2 27.9 mmol/L      Calcium 10.0 mg/dL      Total Protein 7.9 g/dL      Albumin 4.50 g/dL      ALT (SGPT) 22 U/L      AST (SGOT) 21 U/L      Alkaline Phosphatase 70 U/L      Total Bilirubin 0.4 mg/dL      eGFR Non African Amer 75 mL/min/1.73      Globulin 3.4 gm/dL      A/G Ratio 1.3 g/dL      BUN/Creatinine Ratio 12.8     Anion Gap 12.1 mmol/L     Narrative:      GFR Normal >60  Chronic Kidney Disease <60  Kidney Failure <15      CBC & Differential [[id number]]  (Normal) Collected: 02/13/22 0612    Specimen: Blood Updated: 02/13/22 0620    Narrative:      The following orders were created for panel order CBC & Differential.  Procedure                               Abnormality         Status                     ---------                                -----------         ------                     CBC Auto Differential[[id number]]        Normal              Final result                 Please view results for these tests on the individual orders.    CBC Auto Differential [[id number]]  (Normal) Collected: 02/13/22 0612    Specimen: Blood Updated: 02/13/22 0620     WBC 8.94 10*3/mm3      RBC 4.66 10*6/mm3      Hemoglobin 14.1 g/dL      Hematocrit 39.7 %      MCV 85.2 fL      MCH 30.3 pg      MCHC 35.5 g/dL      RDW 12.3 %      RDW-SD 37.9 fl      MPV 8.6 fL      Platelets 361 10*3/mm3      Neutrophil % 73.0 %      Lymphocyte % 19.7 %      Monocyte % 5.9 %      Eosinophil % 1.1 %      Basophil % 0.1 %      Immature Grans % 0.2 %      Neutrophils, Absolute 6.52 10*3/mm3      Lymphocytes, Absolute 1.76 10*3/mm3      Monocytes, Absolute 0.53 10*3/mm3      Eosinophils, Absolute 0.10 10*3/mm3      Basophils, Absolute 0.01 10*3/mm3      Immature Grans, Absolute 0.02 10*3/mm3      nRBC 0.0 /100 WBC     TSH [[id number]]  (Abnormal) Collected: 02/13/22 0612    Specimen: Blood Updated: 02/13/22 0648     TSH 0.221 uIU/mL     Magnesium [[id number]]  (Normal) Collected: 02/13/22 0612    Specimen: Blood Updated: 02/13/22 0637     Magnesium 1.9 mg/dL     Urinalysis With Culture If Indicated - Urine, Clean Catch [[id number]]  (Abnormal) Collected: 02/13/22 0646    Specimen: Urine, Clean Catch Updated: 02/13/22 0723     Color, UA Yellow     Appearance, UA Cloudy     pH, UA 6.5     Specific Gravity, UA 1.017     Glucose, UA Negative     Ketones, UA Negative     Bilirubin, UA Negative     Blood, UA Trace     Protein, UA Negative     Leuk Esterase, UA Moderate (2+)     Nitrite, UA Negative     Urobilinogen, UA 0.2 E.U./dL    Urinalysis, Microscopic Only - Urine, Clean Catch [[id number]]  (Abnormal) Collected: 02/13/22 0646    Specimen: Urine, Clean Catch Updated: 02/13/22 0742     RBC, UA None Seen /HPF      WBC, UA 6-12 /HPF      Bacteria, UA Trace /HPF       Squamous Epithelial Cells, UA None Seen /HPF      Hyaline Casts, UA None Seen /LPF      Methodology Automated Microscopy    Urine Culture - Urine, Urine, Clean Catch [[id number]] Collected: 02/13/22 0646    Specimen: Urine, Clean Catch Updated: 02/13/22 0742           Imaging:  No Radiology Exams Resulted Within Past 24 Hours    Procedures:  Procedures    Progress                      Patient was seen and evaluated the ED by me.  The above history and physical examination was performed as documented.  Diagnostic data was obtained.  Results reviewed.  Discussed with the patient.  Patient's blood pressure was noted to be elevated in triage however remaining blood pressures have been normal to very acceptable levels.  I did discuss the findings with the patient and reassured her that her blood pressure is not critical.  In regards to her some of her symptoms of just not feeling well explained to her it could be results of either new medications or the increase in her beta-blocker.  Advises at this point no changes to her medications but have her follow-up with her primary care provider over the next 1 to 2 weeks for reassessment.  Patient seemed relieved with this and is agreeable to this treatment plan.    I will contact our  today to see if we can get our diabetic educator to make contact with her.  Patient states that she is uninformed regarding what to do and how to treat her diabetes.      Medical Decision Making:  MDM     Final diagnoses:   Malaise and fatigue   Primary hypertension   Type 2 diabetes mellitus without complication, without long-term current use of insulin ([facility])        Disposition:  ED Disposition     ED Disposition Condition Comment    Discharge Stable           Documentation assistance provided by Diana Rubio acting as scribe for Roddy Maxwell DO. Information recorded by the scribe was done at my direction and has been verified and validated by me.              Diana Rubio  02/13/22  0624       Roddy Maxwell DO  02/13/22 0757

## 2022-02-13 NOTE — DISCHARGE INSTRUCTIONS
Ensure plenty of rest.  Drink plenty of fluids.  Continue current home medications as prescribed.  Follow-up with your primary care provider in 1 to 2 weeks for repeat evaluation.  I will contact our  today and asked that we get a referral to our diabetic educator to get you additional information and treatment planning for your diabetes.  Return to the ER for any concerns issues that may arise.

## 2022-02-14 ENCOUNTER — TELEPHONE (OUTPATIENT)
Dept: DIABETES SERVICES | Facility: HOSPITAL | Age: 62
End: 2022-02-14

## 2022-02-14 ENCOUNTER — TELEPHONE (OUTPATIENT)
Dept: FAMILY MEDICINE CLINIC | Facility: CLINIC | Age: 62
End: 2022-02-14

## 2022-02-14 LAB — BACTERIA SPEC AEROBE CULT: NORMAL

## 2022-02-14 NOTE — TELEPHONE ENCOUNTER
Telephoned patient per request of Marco Gonzalez.  Explained to patient she would need to contact primary care for order for Diabetes self management training.  Diabetes Care contact information given to patient.

## 2022-02-14 NOTE — TELEPHONE ENCOUNTER
Caller: Mahsa Spain    Relationship: Self    Best call back number: [phone]    What is the medical concern/diagnosis:  RECENTLY DIAGNOSED WITH TYPE TWO DIABETES     What specialty or service is being requested: DIABETES EDUCATOR     What is the provider, practice or medical service name: [facility]     What is the office location:     What is the office phone number: [phone] OR  [phone]    Any additional details: PATIENT STATES THAT IN ORDER FOR HER INSURANCE TO PAY SHE NEEDS AN ORDER OR REFERRAL TO THIS PROVIDER

## 2022-02-16 RX ORDER — LISINOPRIL AND HYDROCHLOROTHIAZIDE 25; 20 MG/1; MG/1
TABLET ORAL
Qty: 90 TABLET | Refills: 1 | Status: SHIPPED | OUTPATIENT
Start: 2022-02-16 | End: 2022-06-30 | Stop reason: SDUPTHER

## 2022-02-18 ENCOUNTER — TELEPHONE (OUTPATIENT)
Dept: FAMILY MEDICINE CLINIC | Facility: CLINIC | Age: 62
End: 2022-02-18

## 2022-02-18 DIAGNOSIS — E11.65 TYPE 2 DIABETES MELLITUS WITH HYPERGLYCEMIA, WITHOUT LONG-TERM CURRENT USE OF INSULIN: Primary | ICD-10-CM

## 2022-02-18 NOTE — TELEPHONE ENCOUNTER
Caller: Mahsa Spain     Relationship: Self     Best call back number: [phone]     What is the medical concern/diagnosis:  RECENTLY DIAGNOSED WITH TYPE TWO DIABETES      What specialty or service is being requested: DIABETES EDUCATOR      What is the provider, practice or medical service name: [facility]      What is the office location:      What is the office phone number: [phone] OR  [phone]     Any additional details: PATIENT STATES THAT IN ORDER FOR HER INSURANCE TO PAY SHE NEEDS AN ORDER OR REFERRAL TO THIS PROVIDER

## 2022-02-25 ENCOUNTER — TELEPHONE (OUTPATIENT)
Dept: FAMILY MEDICINE CLINIC | Facility: CLINIC | Age: 62
End: 2022-02-25

## 2022-02-25 NOTE — TELEPHONE ENCOUNTER
Hub staff attempted to follow warm transfer process and was unsuccessful     Caller: Mahsa Spain    Relationship to patient: Self    Best call back number: [phone]    Patient is needing: PATIENT IS REQUESTING CALL BACK ON HER REFERRAL FOR DIABETES EDUCATOR. SHE STATES SHE HAS NOT HEARD ANY UPDATES IN A WHILE AND LAST TIME IT WAS PLACED INCORRECTLY. PLEASE CALL AND ADVISE.

## 2022-03-03 ENCOUNTER — EDUCATION (OUTPATIENT)
Dept: DIABETES SERVICES | Facility: HOSPITAL | Age: 62
End: 2022-03-03

## 2022-03-03 DIAGNOSIS — IMO0002 DIABETES MELLITUS TYPE 2, UNCONTROLLED, WITH COMPLICATIONS: Primary | ICD-10-CM

## 2022-03-03 PROCEDURE — G0108 DIAB MANAGE TRN  PER INDIV: HCPCS

## 2022-03-03 NOTE — PROGRESS NOTES
Initial Visit and Education Plan:  Meeta Spain 61 y.o. presented to [facility] for initial self management education.  Patient states the reason for their visit is to gain more information. Patient is referred by Celsa Herndon*.     Ht: 63 inches    Wt: 171    No Known Allergies     LABS:  Lab Results (most recent)     Results for MEETA SPAIN (MRN [id number]) as of 3/3/2022 12:52   Ref. Range 1/25/2022 10:57 2/13/2022 06:12 2/13/2022 06:46   Glucose Latest Ref Range: 65 - 99 mg/dL 109 (H) 152 (H)    Sodium Latest Ref Range: 136 - 145 mmol/L 137 138    Potassium Latest Ref Range: 3.5 - 5.2 mmol/L 4.0 3.6    CO2 Latest Ref Range: 22.0 - 29.0 mmol/L 29.5 (H) 27.9    Chloride Latest Ref Range: 98 - 107 mmol/L 96 (L) 98    Anion Gap Latest Ref Range: 5.0 - 15.0 mmol/L 11.5 12.1    Creatinine Latest Ref Range: 0.57 - 1.00 mg/dL 0.80 0.78    BUN Latest Ref Range: 8 - 23 mg/dL 7 (L) 10    BUN/Creatinine Ratio Latest Ref Range: 7.0 - 25.0  8.8 12.8    Calcium Latest Ref Range: 8.6 - 10.5 mg/dL 10.1 10.0    eGFR Non African Am Latest Ref Range: >60 mL/min/1.73 73 75    Alkaline Phosphatase Latest Ref Range: 39 - 117 U/L 75 70    Total Protein Latest Ref Range: 6.0 - 8.5 g/dL 7.8 7.9    ALT (SGPT) Latest Ref Range: 1 - 33 U/L 31 22    AST (SGOT) Latest Ref Range: 1 - 32 U/L 23 21    Total Bilirubin Latest Ref Range: 0.0 - 1.2 mg/dL 0.3 0.4    Albumin Latest Ref Range: 3.50 - 5.20 g/dL 4.70 4.50    Globulin Latest Units: gm/dL 3.1 3.4    A/G Ratio Latest Units: g/dL 1.5 1.3    Hemoglobin A1C Latest Ref Range: 4.80 - 5.60 % 7.22 (H)     TSH Baseline Latest Ref Range: 0.270 - 4.200 uIU/mL 0.312 0.221 (L)    Total Cholesterol Latest Ref Range: 0 - 200 mg/dL 224 (H)     HDL Cholesterol Latest Ref Range: 40 - 60 mg/dL 46     LDL Cholesterol  Latest Ref Range: 0 - 100 mg/dL 154 (H)     VLDL Cholesterol Latest Ref Range: 5 - 40 mg/dL 24     Triglycerides Latest Ref Range: 0 - 150 mg/dL 133      LDL/HDL Ratio Unknown 3.29     Magnesium Latest Ref Range: 1.6 - 2.4 mg/dL  1.9             Labs reviewed with patient.    Past Medical History:   Diagnosis Date   • Arthritis    • Condition not found 06/12/2015    Right Total Knee   • Diabetes mellitus (HCC)    • Hemorrhoids    • HTN (hypertension)    • Hyperlipidemia    • Primary osteoarthritis of left knee 02/13/2018        Past Surgical History:   • COLONOSCOPY   • HIP FRACTURE SURGERY    Repair   • HYSTERECTOMY   • KNEE SURGERY    scope   • OTHER SURGICAL HISTORY    Joint Surgery   • OTHER SURGICAL HISTORY    Metal Implants   • OTHER SURGICAL HISTORY    Artificial Joinys/Limb        Social History     Tobacco Use   • Smoking status: Never Smoker   • Smokeless tobacco: Never Used   Vaping Use   • Vaping Use: Never used   Substance Use Topics   • Alcohol use: Yes     Comment: Rarely drinks,    • Drug use: Never        Family History   Problem Relation Age of Onset   • Osteoarthritis Mother    • Depression Mother    • Hypertension Mother    • Arthritis Mother    • Osteoporosis Mother    • Arthritis Brother         Education Plan as follows:      Blood Glucose Monitoring Instructions and Plan:   Patient was not instructed to test blood glucose by provider.  We will discuss this at her next visit.   Initial Nutrition Instructions and Plan:   Patient was instructed and demonstrated reading a food label. Serving size and carbohydrate amounts were emphasized.   45 carbs per meal 3 meals a day  15 carbs per snacks3 snacks per day.   Patient has not formally seen a dietitian. Patient was given written materials including Nutrition in the [employer].   [employer] explained and demonstrated to patient.     Medication Instructions and Plan:     Current Outpatient Medications:   •  Alendronate Sodium (FOSAMAX PO), Take  by mouth., Disp: , Rfl:   •  atorvastatin (LIPITOR) 10 MG tablet, Take 1 tablet by mouth Daily., Disp: 30 tablet, Rfl: 5  •  busPIRone (BUSPAR) 5 MG  tablet, Take 1 tablet by mouth 3 (Three) Times a Day., Disp: 90 tablet, Rfl: 5  •  diclofenac (VOLTAREN) 75 MG EC tablet, Take 1 tablet by mouth 2 (Two) Times a Day., Disp: 60 tablet, Rfl: 5  •  escitalopram (Lexapro) 10 MG tablet, Take 1 tablet by mouth Daily., Disp: 30 tablet, Rfl: 2  •  hydrOXYzine (ATARAX) 50 MG tablet, Take 1 tablet by mouth At Night As Needed for Anxiety (sleep)., Disp: 30 tablet, Rfl: 5  •  lisinopril-hydrochlorothiazide (PRINZIDE,ZESTORETIC) 20-25 MG per tablet, TAKE 1 TABLET BY MOUTH ONCE DAILY, Disp: 90 tablet, Rfl: 1  •  metFORMIN ER (Glucophage XR) 500 MG 24 hr tablet, Take 1 tablet by mouth Daily With Breakfast., Disp: 30 tablet, Rfl: 5  •  metoprolol succinate XL (TOPROL-XL) 50 MG 24 hr tablet, Take 1 tablet by mouth Daily., Disp: 30 tablet, Rfl: 5   Medication list was reviewed with patient. Patient denies questions or concerns regarding current medication therapy. Patient was instructed to continue medications as prescribed.     Exercise:   Patient has been instructed to walk for 10 minutes after each meal initially and build strength and endurance to achieve 30 minutes of sustained exercise per day; recommended patient seek advice from Primary Care Provider prior to beginning any exercise program if other health concerns exist.     Problem solving and reducing risks:   I explained the importance of keeping provider appointments, taking medications as prescribed, having laboratory work performed as ordered by provider and seeking care as soon as possible when complications occur.     Patient Selected Behavioral Goal :  #1 - take medications as prescribed  #2 - decreasing portion sizes and carbohydrates    Patient Selected Ongoing Support Plan:  [employer]abetes [employer] and Family Member Support          Follow up Instructions and Plan:Patient was encouraged to contact DSME staff with questions and or concerns.  DSME staff contact information was given to patient.  Patient will be contacted  when group classes resume.  Patient is scheduled to return oin one month. DSME staff will contact patient at 3 months and 6 months to evaluate patient's further needs regarding diabetes.      Other:Patient is a retired .    Start Time: 9:00  End Time: 10:10    So Lara RN, BSN  03/03/2022

## 2022-03-22 ENCOUNTER — OFFICE VISIT (OUTPATIENT)
Dept: ORTHOPEDIC SURGERY | Facility: CLINIC | Age: 62
End: 2022-03-22

## 2022-03-22 VITALS — WEIGHT: 171 LBS | BODY MASS INDEX: 30.3 KG/M2 | HEIGHT: 63 IN

## 2022-03-22 DIAGNOSIS — M25.562 LEFT KNEE PAIN, UNSPECIFIED CHRONICITY: ICD-10-CM

## 2022-03-22 DIAGNOSIS — M17.12 PRIMARY OSTEOARTHRITIS OF LEFT KNEE: Primary | ICD-10-CM

## 2022-03-22 PROCEDURE — 20610 DRAIN/INJ JOINT/BURSA W/O US: CPT | Performed by: ORTHOPAEDIC SURGERY

## 2022-03-22 PROCEDURE — 99213 OFFICE O/P EST LOW 20 MIN: CPT | Performed by: ORTHOPAEDIC SURGERY

## 2022-03-22 RX ORDER — LIDOCAINE HYDROCHLORIDE 10 MG/ML
9 INJECTION, SOLUTION INFILTRATION; PERINEURAL
Status: COMPLETED | OUTPATIENT
Start: 2022-03-22 | End: 2022-03-22

## 2022-03-22 RX ORDER — TRIAMCINOLONE ACETONIDE 40 MG/ML
40 INJECTION, SUSPENSION INTRA-ARTICULAR; INTRAMUSCULAR
Status: COMPLETED | OUTPATIENT
Start: 2022-03-22 | End: 2022-03-22

## 2022-03-22 RX ADMIN — LIDOCAINE HYDROCHLORIDE 9 ML: 10 INJECTION, SOLUTION INFILTRATION; PERINEURAL at 09:03

## 2022-03-22 RX ADMIN — TRIAMCINOLONE ACETONIDE 40 MG: 40 INJECTION, SUSPENSION INTRA-ARTICULAR; INTRAMUSCULAR at 09:03

## 2022-03-22 NOTE — PROGRESS NOTES
"Chief Complaint  Initial Evaluation of the Left Knee     Subjective      Mahsa Spain presents to [facility] for an evaluation of left knee. Patient has a history of left knee osteoarthritis, she has treated this conservatively. Patient has a history of a right total knee replacement performed in 2014. She states her left knee feels like the right knee prior to surgery. Pain has progressively worsened with time. She has tried injections in the past. She has been taking Tylenol as needed for the pain. Patient got diagnosed with Type II diabetes recently.     No Known Allergies     Social History     Socioeconomic History   • Marital status:    Tobacco Use   • Smoking status: Never Smoker   • Smokeless tobacco: Never Used   Vaping Use   • Vaping Use: Never used   Substance and Sexual Activity   • Alcohol use: Yes     Comment: Rarely drinks,    • Drug use: Never   • Sexual activity: Defer        Review of Systems     Objective   Vital Signs:   Ht 160 cm (63\")   Wt 77.6 kg (171 lb)   BMI 30.29 kg/m²       Physical Exam  Constitutional:       Appearance: Normal appearance. Patient is well-developed and normal weight.   HENT:      Head: Normocephalic.      Right Ear: Hearing and external ear normal.      Left Ear: Hearing and external ear normal.      Nose: Nose normal.   Eyes:      Conjunctiva/sclera: Conjunctivae normal.   Cardiovascular:      Rate and Rhythm: Normal rate.   Pulmonary:      Effort: Pulmonary effort is normal.      Breath sounds: No wheezing or rales.   Abdominal:      Palpations: Abdomen is soft.      Tenderness: There is no abdominal tenderness.   Musculoskeletal:      Cervical back: Normal range of motion.   Skin:     Findings: No rash.   Neurological:      Mental Status: Patient is alert and oriented to person, place, and time.   Psychiatric:         Mood and Affect: Mood and affect normal.         Judgment: Judgment normal.       Ortho Exam      LEFT KNEE: " Calf supple, non-tender, no signs of DVT. Dorsal Pedal Pulse 2+, posterior tibialis pulse 2+. Sensation grossly intact. Neurovascular intact.  Tender medial and lateral joint line. Non-antalgic gait. Ambulation without an assistive device. No swelling, skin discoloration or atrophy. Good strength to hamstrings, quadriceps, dorsiflexors and plantar flexors. Stable to varus/valgus stress. Stable anterior and posterior drawer. Full extension. Flexion to 135 degrees with pain.       Large Joint Arthrocentesis: L knee  Date/Time: 3/22/2022 9:03 AM  Consent given by: patient  Site marked: site marked  Timeout: Immediately prior to procedure a time out was called to verify the correct patient, procedure, equipment, support staff and site/side marked as required   Supporting Documentation  Indications: pain   Procedure Details  Location: knee - L knee  Needle gauge: 21G.  Medications administered: 9 mL lidocaine 1 %; 40 mg triamcinolone acetonide 40 MG/ML  Patient tolerance: patient tolerated the procedure well with no immediate complications            Imaging Results (Most Recent)     Procedure Component Value Units Date/Time    XR Knee 3 View Left [[id number]] Resulted: 03/22/22 0828     Updated: 03/22/22 0830           Result Review :     X-Ray Report:  Left knee(s) X-Ray  Indication: Evaluation of left knee pain   AP, Lateral and Standing view(s)  Findings: Tricompartmental arthritis most prominent in the medial and lateral compartment.   Prior studies available for comparison: no     Assessment and Plan     DX: Left knee osteoarthritis     Discussed treatment plans and diagnosis with the patient. She is a candidate for a left total knee replacement. She wants to hold off on this at this time, she has a trip coming up in May. Discussed repeat steroid injections vs gel injections. Patient is diabetic, she was educated on watching her sugar levels. She understands and wishes to proceed. A left knee steroid injection  given, she tolerated this well.     Call or return if worsening symptoms.    Follow Up     PRN.       Patient was given instructions and counseling regarding her condition or for health maintenance advice. Please see specific information pulled into the AVS if appropriate.     Scribed for Mt Card MD by Christianne Lundberg.  03/22/22   08:50 EDT    I have personally performed the services described in this document as scribed by the above individual and it is both accurate and complete. Mt Card MD 03/22/22

## 2022-04-04 ENCOUNTER — EDUCATION (OUTPATIENT)
Dept: DIABETES SERVICES | Facility: HOSPITAL | Age: 62
End: 2022-04-04

## 2022-04-04 DIAGNOSIS — E11.49 DM (DIABETES MELLITUS), TYPE 2 WITH NEUROLOGICAL COMPLICATIONS: Primary | ICD-10-CM

## 2022-04-04 PROCEDURE — G0108 DIAB MANAGE TRN  PER INDIV: HCPCS

## 2022-04-04 NOTE — PROGRESS NOTES
Mahsa Spain 61 y.o. presents for follow-up visit.      LABS:  Lab Results (most recent)     Same as last visit         Labs reviewed with patient.    Education Plan:     Blood Glucose Monitoring Instructions and Plan:   We reviewed blood glucose testing and ADA recommended blood glucose level for fasting, pre and post meals. Patient demonstrates understanding and importance of testing blood glucose 1-3 times a day; Patient will log BG results. Patient was given written material including blood glucose log.     Initial Nutrition Instructions and Plan:   Patient was instructed and demonstrated reading a food label. Serving size and carbohydrate amounts were emphasized.   45 carbs per meal 3 meals a day  15-20 carbs per snack 3 snacks per day.   Patient has not formally seen a dietitian. Patient was given written materials including Nutrition in the [employer].   [employer] explained and demonstrated to patient.     Medication Instructions and Plan:     Current Outpatient Medications:   •  Alendronate Sodium (FOSAMAX PO), Take  by mouth., Disp: , Rfl:   •  atorvastatin (LIPITOR) 10 MG tablet, Take 1 tablet by mouth Daily., Disp: 30 tablet, Rfl: 5  •  busPIRone (BUSPAR) 5 MG tablet, Take 1 tablet by mouth 3 (Three) Times a Day., Disp: 90 tablet, Rfl: 5  •  diclofenac (VOLTAREN) 75 MG EC tablet, Take 1 tablet by mouth 2 (Two) Times a Day., Disp: 60 tablet, Rfl: 5  •  escitalopram (Lexapro) 10 MG tablet, Take 1 tablet by mouth Daily., Disp: 30 tablet, Rfl: 2  •  hydrOXYzine (ATARAX) 50 MG tablet, Take 1 tablet by mouth At Night As Needed for Anxiety (sleep)., Disp: 30 tablet, Rfl: 5  •  lisinopril-hydrochlorothiazide (PRINZIDE,ZESTORETIC) 20-25 MG per tablet, TAKE 1 TABLET BY MOUTH ONCE DAILY, Disp: 90 tablet, Rfl: 1  •  metFORMIN ER (Glucophage XR) 500 MG 24 hr tablet, Take 1 tablet by mouth Daily With Breakfast., Disp: 30 tablet, Rfl: 5  •  metoprolol succinate XL (TOPROL-XL) 50 MG 24 hr tablet, Take 1 tablet  by mouth Daily., Disp: 30 tablet, Rfl: 5   Medication list was reviewed with patient. Patient denies questions or concerns regarding current medication therapy. Patient was instructed to continue medications as prescribed.     Exercise:   Patient has been instructed to walk for 10 minutes after each meal initially and build strength and endurance to achieve 30 minutes of sustained exercise per day; recommended patient seek advice from Primary Care Provider prior to beginning any exercise program if other health concerns exist.     Problem solving and reducing risks:   I explained the importance of keeping provider appointments, taking medications as prescribed, having laboratory work performed as ordered by provider and seeking care as soon as possible when complications occur.     Follow up Instructions and Plan:Patient was encouraged to contact DSME staff with questions and or concerns.  DSME staff contact information was given to patient.  Patient will be contacted when group classes resume.         Start Time:8;12  End Time: 9:30    So Lara RN, BSN  04/04/2022

## 2022-04-08 ENCOUNTER — TELEPHONE (OUTPATIENT)
Dept: FAMILY MEDICINE CLINIC | Facility: CLINIC | Age: 62
End: 2022-04-08

## 2022-04-08 NOTE — TELEPHONE ENCOUNTER
Caller: Mahsa Spain    Relationship: Self    Best call back number: [phone]    What medication are you requesting: SOMETHING TO HELP WITH DIARRHEA    What are your current symptoms: DIARRHEA, MILD NAUSEA    How long have you been experiencing symptoms: 4 DAYS    Have you had these symptoms before:    [x] Yes  [] No    Have you been treated for these symptoms before:   [x] Yes  [] No    If a prescription is needed, what is your preferred pharmacy and phone number: [facility] #[id number] - [address] [place] AT [facility] [address] - [phone] [facility] [phone]      Additional notes:  PATIENT REPORTS SHE HAS THE 'STOMACH BUG' THAT IS GOING AROUND  AND WOULD LIKE SOMETHING SENT IN, IF NOT WHAT CAN SHE GET OTC THAT COULD HELP.  PLEASE ADVISE.

## 2022-04-25 ENCOUNTER — OFFICE VISIT (OUTPATIENT)
Dept: FAMILY MEDICINE CLINIC | Facility: CLINIC | Age: 62
End: 2022-04-25

## 2022-04-25 VITALS
BODY MASS INDEX: 28.7 KG/M2 | DIASTOLIC BLOOD PRESSURE: 70 MMHG | HEIGHT: 63 IN | OXYGEN SATURATION: 99 % | SYSTOLIC BLOOD PRESSURE: 144 MMHG | WEIGHT: 162 LBS | HEART RATE: 72 BPM | TEMPERATURE: 98.2 F

## 2022-04-25 DIAGNOSIS — I10 PRIMARY HYPERTENSION: Chronic | ICD-10-CM

## 2022-04-25 DIAGNOSIS — L23.9 ALLERGIC CONTACT DERMATITIS, UNSPECIFIED TRIGGER: Primary | ICD-10-CM

## 2022-04-25 DIAGNOSIS — E11.65 TYPE 2 DIABETES MELLITUS WITH HYPERGLYCEMIA, WITHOUT LONG-TERM CURRENT USE OF INSULIN: Chronic | ICD-10-CM

## 2022-04-25 PROCEDURE — 99214 OFFICE O/P EST MOD 30 MIN: CPT

## 2022-04-25 RX ORDER — METOPROLOL SUCCINATE 50 MG/1
50 TABLET, EXTENDED RELEASE ORAL DAILY
Qty: 30 TABLET | Refills: 0 | Status: SHIPPED | OUTPATIENT
Start: 2022-04-25 | End: 2022-06-30 | Stop reason: SDUPTHER

## 2022-04-25 RX ORDER — LANOLIN ALCOHOL/MO/W.PET/CERES
400 CREAM (GRAM) TOPICAL DAILY
COMMUNITY

## 2022-04-25 RX ORDER — MULTIVITAMIN WITH IRON
1 TABLET ORAL DAILY
COMMUNITY

## 2022-04-25 RX ORDER — ZINC GLUCONATE 50 MG
50 TABLET ORAL DAILY
COMMUNITY

## 2022-04-25 RX ORDER — PYRIDOXINE HCL (VITAMIN B6) 100 MG
500 TABLET ORAL DAILY
COMMUNITY

## 2022-04-25 NOTE — PROGRESS NOTES
"Chief Complaint  Rash (Rash started itching on Friday, not painful just very itchy. Along front of neck and chest)    Subjective          Mahsa Spain presents to [facility]  She is here to be seen for an acute visit. She is having a rash on her neck and chest that started on Friday. She states it is not painful, does not burn, just itches and is red and irritated. She states it started out as small red bumps and is now big splotches. She states she has not had a tick bite, not been in the woods, has not pulled weeds or worked in the yard. She has gotten more sun and is using the tanning bed some.     She was stared on atorvastatin and metformin 3 months ago and has had no issues with those.     She has been using a benadryl cream on the rash and it has helped the itching.     Her BP is 144/70. With being diabetic and having high blood pressure we will avoid steroid pills and injection at this time. I am going to send in kenalog cream to use on the rash and she has an appointment with Celsa Herndon on Friday for her regular follow up. She will reassess the rash then.       Objective   Vital Signs:   /70   Pulse 72   Temp 98.2 °F (36.8 °C)   Ht 160 cm (63\")   Wt 73.5 kg (162 lb)   SpO2 99%   BMI 28.70 kg/m²            Physical Exam  Constitutional:       Appearance: Normal appearance.   HENT:      Nose: Nose normal.      Mouth/Throat:      Mouth: Mucous membranes are moist.   Cardiovascular:      Rate and Rhythm: Normal rate and regular rhythm.   Pulmonary:      Effort: Pulmonary effort is normal.      Breath sounds: Normal breath sounds.   Skin:     General: Skin is warm and dry.      Findings: Rash (chest and neck) present.   Neurological:      General: No focal deficit present.      Mental Status: She is alert and oriented to person, place, and time.   Psychiatric:         Mood and Affect: Mood normal.         Behavior: Behavior normal.        Result Review :        "          Assessment and Plan    Diagnoses and all orders for this visit:    1. Allergic contact dermatitis, unspecified trigger (Primary)  -     triamcinolone (KENALOG) 0.1 % ointment; Apply 1 application topically to the appropriate area as directed 2 (Two) Times a Day for 7 days.  Dispense: 14 g; Refill: 0    2. Type 2 diabetes mellitus with hyperglycemia, without long-term current use of insulin (HCC)  Comments:  On Metformin. Following up with Celsa Herndon on friday      3. Primary hypertension  Comments:  Takes Toprol        Follow Up   Return if symptoms worsen or fail to improve.  Patient was given instructions and counseling regarding her condition or for health maintenance advice. Please see specific information pulled into the AVS if appropriate.

## 2022-04-26 ENCOUNTER — LAB (OUTPATIENT)
Dept: LAB | Facility: HOSPITAL | Age: 62
End: 2022-04-26

## 2022-04-26 DIAGNOSIS — E11.65 TYPE 2 DIABETES MELLITUS WITH HYPERGLYCEMIA, WITHOUT LONG-TERM CURRENT USE OF INSULIN: ICD-10-CM

## 2022-04-26 LAB
CHOLEST SERPL-MCNC: 122 MG/DL (ref 0–200)
HBA1C MFR BLD: 6.2 % (ref 4.8–5.6)
HDLC SERPL-MCNC: 54 MG/DL (ref 40–60)
LDLC SERPL CALC-MCNC: 47 MG/DL (ref 0–100)
LDLC/HDLC SERPL: 0.83 {RATIO}
TRIGL SERPL-MCNC: 115 MG/DL (ref 0–150)
VLDLC SERPL-MCNC: 21 MG/DL (ref 5–40)

## 2022-04-26 PROCEDURE — 83036 HEMOGLOBIN GLYCOSYLATED A1C: CPT

## 2022-04-26 PROCEDURE — 80061 LIPID PANEL: CPT

## 2022-04-26 PROCEDURE — 36415 COLL VENOUS BLD VENIPUNCTURE: CPT

## 2022-04-26 RX ORDER — METFORMIN HYDROCHLORIDE 500 MG/1
500 TABLET, EXTENDED RELEASE ORAL
Qty: 30 TABLET | Refills: 0 | Status: SHIPPED | OUTPATIENT
Start: 2022-04-26 | End: 2022-07-26

## 2022-04-26 RX ORDER — ATORVASTATIN CALCIUM 10 MG/1
10 TABLET, FILM COATED ORAL DAILY
Qty: 30 TABLET | Refills: 0 | Status: SHIPPED | OUTPATIENT
Start: 2022-04-26 | End: 2022-07-26

## 2022-04-28 ENCOUNTER — OFFICE VISIT (OUTPATIENT)
Dept: FAMILY MEDICINE CLINIC | Facility: CLINIC | Age: 62
End: 2022-04-28

## 2022-04-28 ENCOUNTER — TELEPHONE (OUTPATIENT)
Dept: FAMILY MEDICINE CLINIC | Facility: CLINIC | Age: 62
End: 2022-04-28

## 2022-04-28 VITALS
SYSTOLIC BLOOD PRESSURE: 134 MMHG | OXYGEN SATURATION: 99 % | DIASTOLIC BLOOD PRESSURE: 68 MMHG | HEART RATE: 70 BPM | HEIGHT: 63 IN | WEIGHT: 163 LBS | BODY MASS INDEX: 28.88 KG/M2 | TEMPERATURE: 97.7 F

## 2022-04-28 DIAGNOSIS — L25.9 CONTACT DERMATITIS, UNSPECIFIED CONTACT DERMATITIS TYPE, UNSPECIFIED TRIGGER: ICD-10-CM

## 2022-04-28 DIAGNOSIS — E11.9 TYPE 2 DIABETES MELLITUS WITHOUT COMPLICATION, WITHOUT LONG-TERM CURRENT USE OF INSULIN: Primary | ICD-10-CM

## 2022-04-28 DIAGNOSIS — E78.5 HYPERLIPIDEMIA, UNSPECIFIED HYPERLIPIDEMIA TYPE: ICD-10-CM

## 2022-04-28 DIAGNOSIS — E66.3 OVERWEIGHT (BMI 25.0-29.9): ICD-10-CM

## 2022-04-28 DIAGNOSIS — M19.90 ARTHRITIS: ICD-10-CM

## 2022-04-28 DIAGNOSIS — I10 ESSENTIAL HYPERTENSION: ICD-10-CM

## 2022-04-28 PROCEDURE — 99214 OFFICE O/P EST MOD 30 MIN: CPT | Performed by: NURSE PRACTITIONER

## 2022-04-28 PROCEDURE — 96372 THER/PROPH/DIAG INJ SC/IM: CPT | Performed by: NURSE PRACTITIONER

## 2022-04-28 RX ORDER — LANCETS 33 GAUGE
1 EACH MISCELLANEOUS
COMMUNITY
End: 2022-04-28 | Stop reason: SDUPTHER

## 2022-04-28 RX ORDER — METHYLPREDNISOLONE SODIUM SUCCINATE 125 MG/2ML
125 INJECTION, POWDER, LYOPHILIZED, FOR SOLUTION INTRAMUSCULAR; INTRAVENOUS ONCE
Status: COMPLETED | OUTPATIENT
Start: 2022-04-28 | End: 2022-04-28

## 2022-04-28 RX ORDER — LANCETS 33 GAUGE
1 EACH MISCELLANEOUS DAILY
Qty: 1 EACH | Refills: 3 | Status: SHIPPED | OUTPATIENT
Start: 2022-04-28

## 2022-04-28 RX ADMIN — METHYLPREDNISOLONE SODIUM SUCCINATE 125 MG: 125 INJECTION, POWDER, LYOPHILIZED, FOR SOLUTION INTRAMUSCULAR; INTRAVENOUS at 08:51

## 2022-04-28 NOTE — ASSESSMENT & PLAN NOTE
Patient's (Body mass index is 28.87 kg/m².) indicates that they are overweight with health conditions that include hypertension, diabetes mellitus and dyslipidemias . Weight is improving with lifestyle modifications. BMI is is above average; BMI management plan is completed. We discussed portion control and increasing exercise.

## 2022-04-28 NOTE — PROGRESS NOTES
Chief Complaint   Patient presents with   • Diabetes     Is needing needles and strips,A1C 6.2         Subjective          Mahsa Spain presents to [facility]    Reviewed all recent labs and medications. Pt presents FU T2DM, hyperlipidemia, htn. All well controlled with medication, diet/exercise modifications. Pt with continued rash on chest and neck. States it has been present for 5 days. Applying triamcinalone cream to treat which is helping, but has not resolved. States rash is pruritic, but not painful. Denies fever, chills, SOB, myalgia, HA or other.       Past History:  Medical History: has a past medical history of Arthritis, Condition not found (06/12/2015), Diabetes mellitus (HCC), Hemorrhoids, HTN (hypertension), Hyperlipidemia, and Primary osteoarthritis of left knee (02/13/2018).   Surgical History: has a past surgical history that includes Colonoscopy; Hip fracture surgery (Right); Hysterectomy; Other surgical history; Other surgical history; Other surgical history; and Knee surgery (Right).   Family History: family history includes Arthritis in her brother and mother; Depression in her mother; Hypertension in her mother; Osteoarthritis in her mother; Osteoporosis in her mother.   Social History: reports that she has never smoked. She has never used smokeless tobacco. She reports previous alcohol use. She reports that she does not use drugs.  Allergies: Patient has no known allergies.  (Not in a hospital admission)       Social History     Socioeconomic History   • Marital status:    Tobacco Use   • Smoking status: Never Smoker   • Smokeless tobacco: Never Used   Vaping Use   • Vaping Use: Never used   Substance and Sexual Activity   • Alcohol use: Not Currently     Comment: Rarely drinks,    • Drug use: Never   • Sexual activity: Defer       There are no preventive care reminders to display for this patient.    Objective     Vital Signs:   /68   Pulse  "70   Temp 97.7 °F (36.5 °C)   Ht 160 cm (63\")   Wt 73.9 kg (163 lb)   SpO2 99%   BMI 28.87 kg/m²       Physical Exam  Vitals reviewed.   Constitutional:       General: She is not in acute distress.     Appearance: Normal appearance.   HENT:      Head: Normocephalic.      Right Ear: Tympanic membrane normal.      Left Ear: Tympanic membrane normal.      Nose: Nose normal.      Mouth/Throat:      Pharynx: Oropharynx is clear. No posterior oropharyngeal erythema.   Eyes:      General: No scleral icterus.     Extraocular Movements: Extraocular movements intact.      Conjunctiva/sclera: Conjunctivae normal.      Pupils: Pupils are equal, round, and reactive to light.   Cardiovascular:      Rate and Rhythm: Normal rate and regular rhythm.      Pulses: Normal pulses.      Heart sounds: Normal heart sounds.   Pulmonary:      Effort: Pulmonary effort is normal.      Breath sounds: Normal breath sounds.   Abdominal:      General: Bowel sounds are normal.      Palpations: Abdomen is soft.   Musculoskeletal:         General: Normal range of motion.      Cervical back: Neck supple.   Skin:     General: Skin is warm and dry.      Findings: Erythema and rash present. Rash is papular.          Neurological:      Mental Status: She is alert and oriented to person, place, and time.   Psychiatric:         Mood and Affect: Mood normal.         Behavior: Behavior normal.         Thought Content: Thought content normal.         Judgment: Judgment normal.          Review of Systems   Constitutional: Negative for chills, fatigue and fever.   HENT: Negative for congestion, ear pain, sinus pressure and sore throat.    Eyes: Negative for blurred vision.   Respiratory: Negative for cough and shortness of breath.    Cardiovascular: Negative for chest pain, palpitations and leg swelling.   Gastrointestinal: Negative for abdominal pain, constipation, diarrhea, nausea, vomiting and GERD.   Musculoskeletal: Negative for arthralgias.   Skin: " Positive for rash. Negative for skin lesions.   Neurological: Negative for dizziness and headache.   Psychiatric/Behavioral: Negative for sleep disturbance, suicidal ideas and depressed mood. The patient is not nervous/anxious.         Result Review :     Common labs    Common Labsle 1/25/22 1/25/22 1/25/22 1/25/22 2/13/22 2/13/22 4/26/22 4/26/22    1057 1057 1057 1057 0612 0612 0832 0832   Glucose   109 (A)   152 (A)     BUN   7 (A)   10     Creatinine   0.80   0.78     eGFR Non African Am   73   75     Sodium   137   138     Potassium   4.0   3.6     Chloride   96 (A)   98     Calcium   10.1   10.0     Albumin   4.70   4.50     Total Bilirubin   0.3   0.4     Alkaline Phosphatase   75   70     AST (SGOT)   23   21     ALT (SGPT)   31   22     WBC 8.52    8.94      Hemoglobin 14.2    14.1      Hematocrit 42.7    39.7      Platelets 406    361      Total Cholesterol  224 (A)     122    Triglycerides  133     115    HDL Cholesterol  46     54    LDL Cholesterol   154 (A)     47    Hemoglobin A1C    7.22 (A)    6.20 (A)   (A) Abnormal value                      Assessment and Plan    Diagnoses and all orders for this visit:    1. Type 2 diabetes mellitus without complication, without long-term current use of insulin (HCC) (Primary)  Assessment & Plan:  Diabetes is improving with treatment.   Continue current treatment regimen.  Diabetes will be reassessed in 6 months.      2. Essential hypertension  Assessment & Plan:  Hypertension is improving with treatment.  Continue current treatment regimen.  Dietary sodium restriction.  Weight loss.  Regular aerobic exercise.  Blood pressure will be reassessed at the next regular appointment.      3. Hyperlipidemia, unspecified hyperlipidemia type    4. Arthritis  Comments:  Controlled  Continue voltaren gel UAD      5. Overweight (BMI 25.0-29.9)  Assessment & Plan:  Patient's (Body mass index is 28.87 kg/m².) indicates that they are overweight with health conditions that  include hypertension, diabetes mellitus and dyslipidemias . Weight is improving with lifestyle modifications. BMI is is above average; BMI management plan is completed. We discussed portion control and increasing exercise.       6. Contact dermatitis, unspecified contact dermatitis type, unspecified trigger  Comments:  Unresolved   solumedrol 125mg IM inj   Continue triamcinalone UAD  Keep diary to identify and avoid triggers   Cool baths, calomine lotion PRN   Orders:  -     methylPREDNISolone sodium succinate (SOLU-Medrol) injection 125 mg    Other orders  -     Lancets 33G misc; 1 each Daily.  Dispense: 1 each; Refill: 3  -     glucose blood test strip; 1 each by Other route Daily. Use as instructed  Dispense: 1 each; Refill: 3        Follow Up   Return in about 6 months (around 10/28/2022), or if symptoms worsen or fail to improve.  Patient was given instructions and counseling regarding her condition or for health maintenance advice. Please see specific information pulled into the AVS if appropriate.

## 2022-05-05 ENCOUNTER — APPOINTMENT (OUTPATIENT)
Dept: DIABETES SERVICES | Facility: HOSPITAL | Age: 62
End: 2022-05-05

## 2022-05-09 ENCOUNTER — EDUCATION (OUTPATIENT)
Dept: DIABETES SERVICES | Facility: HOSPITAL | Age: 62
End: 2022-05-09

## 2022-05-09 DIAGNOSIS — E11.8 TYPE 2 DIABETES MELLITUS WITH UNSPECIFIED COMPLICATIONS: Primary | ICD-10-CM

## 2022-05-09 PROCEDURE — G0108 DIAB MANAGE TRN  PER INDIV: HCPCS

## 2022-05-09 NOTE — PROGRESS NOTES
Mahsa Spain 61 y.o. presents for follow-up visit.        Wt:163 pounds stated weight    LABS:  Lab Results (most recent)      Latest Reference Range & Units 04/26/22 08:32   Hemoglobin A1C 4.80 - 5.60 % 6.20 (H)   Total Cholesterol 0 - 200 mg/dL 122   HDL Cholesterol 40 - 60 mg/dL 54   LDL Cholesterol  0 - 100 mg/dL 47   VLDL Cholesterol 5 - 40 mg/dL 21   Triglycerides 0 - 150 mg/dL 115   LDL/HDL Ratio  0.83   (H): Data is abnormally high         Labs reviewed with patient.    Education Plan:       Blood Glucose Monitoring Instructions and Plan:   We reviewed blood glucose testing and ADA recommended blood glucose level for fasting, pre and post meals. Patient demonstrates understanding and importance of testing blood glucose 1-2 times a day; Patient will log BG results. Patient was given written material including blood glucose log.     Initial Nutrition Instructions and Plan:   Ffood label reading was emphasized. Serving size and carbohydrate amounts were emphasized.   45 carbs per meal 3 meals a day  15-20 carbs per snack 3 snacks per day.     Medication Instructions and Plan:     Current Outpatient Medications:   •  Alendronate Sodium (FOSAMAX PO), Take 1 tablet by mouth 1 (One) Time Per Week., Disp: , Rfl:   •  ascorbic acid (VITAMIN C) 500 MG capsule controlled-release CR capsule, Vitamin C 500 mg oral capsule, extended release take 1 capsule by oral route daily   Active, Disp: , Rfl:   •  atorvastatin (LIPITOR) 10 MG tablet, TAKE 1 TABLET BY MOUTH DAILY, Disp: 30 tablet, Rfl: 0  •  Cholecalciferol 10 MCG (400 UNIT) capsule, Vitamin D3 400 unit oral capsule take 1 capsule by oral route daily   Active, Disp: , Rfl:   •  Cranberry 500 MG capsule, Take  by mouth., Disp: , Rfl:   •  Cyanocobalamin 500 MCG lozenge, Vitamin B-12 500 mcg oral lozenge dissolve 1 lozenge by oral route daily   Active, Disp: , Rfl:   •  diclofenac (VOLTAREN) 75 MG EC tablet, Take 1 tablet by mouth 2 (Two) Times a Day., Disp: 60  tablet, Rfl: 5  •  folic acid (FOLVITE) 400 MCG tablet, folic acid 400 mcg oral tablet take 1 tablet (0.4 mg) by oral route once daily   Active, Disp: , Rfl:   •  Garlic 100 MG tablet, Take  by mouth., Disp: , Rfl:   •  glucose blood test strip, 1 each by Other route Daily. Use as instructed, Disp: 1 each, Rfl: 3  •  Lancets 33G misc, 1 each Daily., Disp: 1 each, Rfl: 3  •  lisinopril-hydrochlorothiazide (PRINZIDE,ZESTORETIC) 20-25 MG per tablet, TAKE 1 TABLET BY MOUTH ONCE DAILY, Disp: 90 tablet, Rfl: 1  •  Magnesium 250 MG tablet, Take  by mouth., Disp: , Rfl:   •  metFORMIN ER (GLUCOPHAGE-XR) 500 MG 24 hr tablet, TAKE 1 TABLET BY MOUTH DAILY WITH BREAKFAST, Disp: 30 tablet, Rfl: 0  •  metoprolol succinate XL (TOPROL-XL) 50 MG 24 hr tablet, TAKE 1 TABLET BY MOUTH DAILY, Disp: 30 tablet, Rfl: 0  •  POTASSIUM PO, potassium 99 mg oral tablet take 1 tablet by oral route daily for 30 days   Active, Disp: , Rfl:   •  Zinc 50 MG tablet, Take  by mouth., Disp: , Rfl:    Medication list was reviewed with patient. Patient denies questions or concerns regarding current medication therapy. Patient was instructed to continue medications as prescribed.     Exercise:   Patient has been instructed to walk for 10 minutes after each meal initially and build strength and endurance to achieve 30 minutes of sustained exercise per day; recommended patient seek advice from Primary Care Provider prior to beginning any exercise program if other health concerns exist.     Problem solving and reducing risks:   I explained the importance of keeping provider appointments, taking medications as prescribed, having laboratory work performed as ordered by provider and seeking care as soon as possible when complications occur.     Follow up Instructions and Plan:Patient was encouraged to contact DSME staff with questions and or concerns.  DSME staff contact information was given to patient.  Patient will be contacted when group classes resume.         Start Time: 8:14  End Time: 8:45    So Lara RN, BSN  05/09/2022

## 2022-05-10 ENCOUNTER — TELEPHONE (OUTPATIENT)
Dept: FAMILY MEDICINE CLINIC | Facility: CLINIC | Age: 62
End: 2022-05-10

## 2022-05-10 RX ORDER — LANCETS 30 GAUGE
EACH MISCELLANEOUS
COMMUNITY
End: 2022-05-10

## 2022-05-10 RX ORDER — LANCETS 30 GAUGE
1 EACH MISCELLANEOUS 2 TIMES DAILY
Qty: 1 EACH | Refills: 2 | Status: SHIPPED | OUTPATIENT
Start: 2022-05-10 | End: 2023-04-03

## 2022-05-10 NOTE — TELEPHONE ENCOUNTER
Caller: Mahsa Spain    Relationship: Self    Best call back number: [phone]    What is the best time to reach you: ANYTIME    Who are you requesting to speak with (clinical staff, provider,  specific staff member): NURSE     What was the call regarding: PATIENT IS CALLING STATING THAT MANNY WHITE NEEDS TO SEND IN A NEW SCRIPT FOR THE ONE TOUCH NEEDLES.  PATIENT STATED THAT SHE NEEDS A 30 AND A 33 WAS ORDERED AND THEY WILL NOT FIT HER MACHINE.  SO PLEASE SEND IN A NEW SCRIPT FOR #30 NEEDLES AND ADVISE PATIENT WHEN YOU HAVE DONE SO, SHE NEEDS THESE AS SOON AS POSSIBLE.     Do you require a callback: YES

## 2022-06-07 ENCOUNTER — TELEPHONE (OUTPATIENT)
Dept: FAMILY MEDICINE CLINIC | Facility: CLINIC | Age: 62
End: 2022-06-07

## 2022-06-07 DIAGNOSIS — Z12.31 ENCOUNTER FOR SCREENING MAMMOGRAM FOR BREAST CANCER: Primary | ICD-10-CM

## 2022-06-07 NOTE — TELEPHONE ENCOUNTER
Caller: Mahsa Spain    Relationship: Self    Best call back number: [phone]    What orders are you requesting (i.e. lab or imaging): ANNUAL MAMMOGRAM     In what timeframe would the patient need to come in: AS SOON AS POSSIBLE

## 2022-06-30 RX ORDER — LISINOPRIL AND HYDROCHLOROTHIAZIDE 25; 20 MG/1; MG/1
1 TABLET ORAL DAILY
Qty: 90 TABLET | Refills: 1 | Status: SHIPPED | OUTPATIENT
Start: 2022-06-30 | End: 2023-01-19

## 2022-06-30 RX ORDER — METOPROLOL SUCCINATE 50 MG/1
50 TABLET, EXTENDED RELEASE ORAL DAILY
Qty: 30 TABLET | Refills: 0 | Status: SHIPPED | OUTPATIENT
Start: 2022-06-30 | End: 2022-09-01

## 2022-06-30 NOTE — TELEPHONE ENCOUNTER
Caller: Mahsa Spain    Relationship: Self    Best call back number: [phone]     Requested Prescriptions:   Requested Prescriptions     Pending Prescriptions Disp Refills   • lisinopril-hydrochlorothiazide (PRINZIDE,ZESTORETIC) 20-25 MG per tablet 90 tablet 1     Sig: Take 1 tablet by mouth Daily.   • metoprolol succinate XL (TOPROL-XL) 50 MG 24 hr tablet 30 tablet 0     Sig: Take 1 tablet by mouth Daily.        Pharmacy where request should be sent: [facility] #[id number] [address] AT [facility] [address] - [phone] [facility] [phone] FX     Additional details provided by patient: PATIENT IS NEEDING A REFILL, PLEASE CALL AND ADVISE.     Does the patient have less than a 3 day supply:  [x] Yes  [] No    Higinio Milton Rep   06/30/22 15:24 EDT

## 2022-07-26 RX ORDER — ATORVASTATIN CALCIUM 10 MG/1
10 TABLET, FILM COATED ORAL DAILY
Qty: 30 TABLET | Refills: 0 | Status: SHIPPED | OUTPATIENT
Start: 2022-07-26 | End: 2022-08-22

## 2022-07-26 RX ORDER — METFORMIN HYDROCHLORIDE 500 MG/1
500 TABLET, EXTENDED RELEASE ORAL
Qty: 30 TABLET | Refills: 0 | Status: SHIPPED | OUTPATIENT
Start: 2022-07-26 | End: 2022-08-22

## 2022-08-22 RX ORDER — ATORVASTATIN CALCIUM 10 MG/1
10 TABLET, FILM COATED ORAL DAILY
Qty: 30 TABLET | Refills: 1 | Status: SHIPPED | OUTPATIENT
Start: 2022-08-22 | End: 2022-08-24

## 2022-08-22 RX ORDER — METFORMIN HYDROCHLORIDE 500 MG/1
500 TABLET, EXTENDED RELEASE ORAL
Qty: 30 TABLET | Refills: 1 | Status: SHIPPED | OUTPATIENT
Start: 2022-08-22 | End: 2022-08-24

## 2022-08-24 RX ORDER — METFORMIN HYDROCHLORIDE 500 MG/1
500 TABLET, EXTENDED RELEASE ORAL
Qty: 90 TABLET | Refills: 1 | Status: SHIPPED | OUTPATIENT
Start: 2022-08-24 | End: 2022-09-19 | Stop reason: SDUPTHER

## 2022-08-24 RX ORDER — ATORVASTATIN CALCIUM 10 MG/1
10 TABLET, FILM COATED ORAL DAILY
Qty: 90 TABLET | Refills: 1 | Status: SHIPPED | OUTPATIENT
Start: 2022-08-24 | End: 2022-09-19 | Stop reason: SDUPTHER

## 2022-09-01 ENCOUNTER — OFFICE VISIT (OUTPATIENT)
Dept: ORTHOPEDIC SURGERY | Facility: CLINIC | Age: 62
End: 2022-09-01

## 2022-09-01 VITALS — HEART RATE: 71 BPM | OXYGEN SATURATION: 99 % | HEIGHT: 63 IN | WEIGHT: 160 LBS | BODY MASS INDEX: 28.35 KG/M2

## 2022-09-01 DIAGNOSIS — M17.12 PRIMARY OSTEOARTHRITIS OF LEFT KNEE: Primary | ICD-10-CM

## 2022-09-01 PROCEDURE — 20610 DRAIN/INJ JOINT/BURSA W/O US: CPT | Performed by: ORTHOPAEDIC SURGERY

## 2022-09-01 RX ORDER — METOPROLOL SUCCINATE 50 MG/1
50 TABLET, EXTENDED RELEASE ORAL DAILY
Qty: 90 TABLET | Refills: 0 | Status: SHIPPED | OUTPATIENT
Start: 2022-09-01 | End: 2022-11-28

## 2022-09-01 RX ORDER — METOPROLOL SUCCINATE 50 MG/1
50 TABLET, EXTENDED RELEASE ORAL DAILY
Qty: 30 TABLET | Refills: 0 | Status: SHIPPED | OUTPATIENT
Start: 2022-09-01 | End: 2022-09-01

## 2022-09-01 RX ORDER — LIDOCAINE HYDROCHLORIDE 10 MG/ML
5 INJECTION, SOLUTION INFILTRATION; PERINEURAL
Status: COMPLETED | OUTPATIENT
Start: 2022-09-01 | End: 2022-09-01

## 2022-09-01 RX ORDER — TRIAMCINOLONE ACETONIDE 40 MG/ML
40 INJECTION, SUSPENSION INTRA-ARTICULAR; INTRAMUSCULAR
Status: COMPLETED | OUTPATIENT
Start: 2022-09-01 | End: 2022-09-01

## 2022-09-01 RX ADMIN — TRIAMCINOLONE ACETONIDE 40 MG: 40 INJECTION, SUSPENSION INTRA-ARTICULAR; INTRAMUSCULAR at 08:23

## 2022-09-01 RX ADMIN — LIDOCAINE HYDROCHLORIDE 5 ML: 10 INJECTION, SOLUTION INFILTRATION; PERINEURAL at 08:23

## 2022-09-01 NOTE — PROGRESS NOTES
"Chief Complaint  Pain of the Left Knee     Subjective      Mahsa Spian presents to [facility] for a follow-up of left knee. Patient has a history of left knee osteoarthritis. In March, patient had a steroid injection that gave her some relief from her arthritis symptoms. This injection has worn off now. Her symptoms have returned. She denies any recent injury to the knee.     No Known Allergies     Social History     Socioeconomic History   • Marital status:    Tobacco Use   • Smoking status: Never Smoker   • Smokeless tobacco: Never Used   Vaping Use   • Vaping Use: Never used   Substance and Sexual Activity   • Alcohol use: Not Currently     Comment: Rarely drinks,    • Drug use: Never   • Sexual activity: Defer        Review of Systems     Objective   Vital Signs:   Pulse 71   Ht 160 cm (63\")   Wt 72.6 kg (160 lb)   SpO2 99%   BMI 28.34 kg/m²       Physical Exam  Constitutional:       Appearance: Normal appearance. Patient is well-developed and normal weight.   HENT:      Head: Normocephalic.      Right Ear: Hearing and external ear normal.      Left Ear: Hearing and external ear normal.      Nose: Nose normal.   Eyes:      Conjunctiva/sclera: Conjunctivae normal.   Cardiovascular:      Rate and Rhythm: Normal rate.   Pulmonary:      Effort: Pulmonary effort is normal.      Breath sounds: No wheezing or rales.   Abdominal:      Palpations: Abdomen is soft.      Tenderness: There is no abdominal tenderness.   Musculoskeletal:      Cervical back: Normal range of motion.   Skin:     Findings: No rash.   Neurological:      Mental Status: Patient is alert and oriented to person, place, and time.   Psychiatric:         Mood and Affect: Mood and affect normal.         Judgment: Judgment normal.       Ortho Exam      LEFT KNEE: Full extension. Flexion to 135 degrees with pain. Good strength to hamstrings, quadriceps, dorsiflexors and plantar flexors. Stable to varus/valgus " stress. Stable anterior and posterior drawer. Negative Lachman. Dorsal Pedal Pulse 2+, posterior tibialis pulse 2+. Calf soft. No swelling, skin discoloration or atrophy.       Large Joint Arthrocentesis: L knee  Date/Time: 9/1/2022 8:23 AM  Consent given by: patient  Site marked: site marked  Timeout: Immediately prior to procedure a time out was called to verify the correct patient, procedure, equipment, support staff and site/side marked as required   Supporting Documentation  Indications: pain   Procedure Details  Location: knee - L knee  Needle gauge: 21g.  Medications administered: 40 mg triamcinolone acetonide 40 MG/ML; 5 mL lidocaine 1 %  Patient tolerance: patient tolerated the procedure well with no immediate complications            Imaging Results (Most Recent)     None           Result Review :         No results found.           Assessment and Plan     Diagnoses and all orders for this visit:    1. Primary osteoarthritis of left knee (Primary)        She opted for another steroid injection today, she tolerated this well.     Call or return if worsening symptoms.    Follow Up     PRN.       Patient was given instructions and counseling regarding her condition or for health maintenance advice. Please see specific information pulled into the AVS if appropriate.     Scribed for Mt Card MD by Christianne Lundberg.  09/01/22   08:03 EDT    I have personally performed the services described in this document as scribed by the above individual and it is both accurate and complete. Mt Card MD 09/01/22

## 2022-09-19 RX ORDER — ATORVASTATIN CALCIUM 10 MG/1
10 TABLET, FILM COATED ORAL DAILY
Qty: 90 TABLET | Refills: 1 | Status: SHIPPED | OUTPATIENT
Start: 2022-09-19

## 2022-09-19 RX ORDER — METFORMIN HYDROCHLORIDE 500 MG/1
500 TABLET, EXTENDED RELEASE ORAL
Qty: 90 TABLET | Refills: 1 | Status: SHIPPED | OUTPATIENT
Start: 2022-09-19

## 2022-09-19 NOTE — TELEPHONE ENCOUNTER
Caller: Mahsa Spain    Relationship: Self    Best call back number: [phone]     Requested Prescriptions:   Requested Prescriptions     Pending Prescriptions Disp Refills   • atorvastatin (LIPITOR) 10 MG tablet 90 tablet 1     Sig: Take 1 tablet by mouth Daily.   • metFORMIN ER (GLUCOPHAGE-XR) 500 MG 24 hr tablet 90 tablet 1     Sig: Take 1 tablet by mouth Daily With Breakfast.        Pharmacy where request should be sent: [facility] #[id number] [address] AT [facility] [address] [place] - [phone] [facility] [phone] FX     Additional details provided by patient:     Does the patient have less than a 3 day supply:  [] Yes  [x] No    Higinio SALGUERO   09/19/22 13:05 EDT

## 2022-10-20 ENCOUNTER — TELEPHONE (OUTPATIENT)
Dept: FAMILY MEDICINE CLINIC | Facility: CLINIC | Age: 62
End: 2022-10-20

## 2022-10-20 NOTE — TELEPHONE ENCOUNTER
Caller: Mahsa Spain    Relationship to patient: Self    Best call back number: [phone]    Patient is needing: PATIENT MADE AN APPT FOR 10.28.22 AND WOULD LIKE HER LAB ORDERS BE CALLED IN SO SHE CAN GO TO THE LAB ON 10.24.22.

## 2022-10-21 DIAGNOSIS — E11.9 TYPE 2 DIABETES MELLITUS WITHOUT COMPLICATION, WITHOUT LONG-TERM CURRENT USE OF INSULIN: Primary | ICD-10-CM

## 2022-10-24 ENCOUNTER — LAB (OUTPATIENT)
Dept: LAB | Facility: HOSPITAL | Age: 62
End: 2022-10-24

## 2022-10-24 DIAGNOSIS — E11.9 TYPE 2 DIABETES MELLITUS WITHOUT COMPLICATION, WITHOUT LONG-TERM CURRENT USE OF INSULIN: ICD-10-CM

## 2022-10-24 LAB
ALBUMIN SERPL-MCNC: 4.6 G/DL (ref 3.5–5.2)
ALBUMIN UR-MCNC: 1.3 MG/DL
ALBUMIN/GLOB SERPL: 1.9 G/DL
ALP SERPL-CCNC: 64 U/L (ref 39–117)
ALT SERPL W P-5'-P-CCNC: 18 U/L (ref 1–33)
ANION GAP SERPL CALCULATED.3IONS-SCNC: 12.2 MMOL/L (ref 5–15)
AST SERPL-CCNC: 23 U/L (ref 1–32)
BASOPHILS # BLD AUTO: 0.03 10*3/MM3 (ref 0–0.2)
BASOPHILS NFR BLD AUTO: 0.3 % (ref 0–1.5)
BILIRUB SERPL-MCNC: 0.3 MG/DL (ref 0–1.2)
BUN SERPL-MCNC: 15 MG/DL (ref 8–23)
BUN/CREAT SERPL: 16.5 (ref 7–25)
CALCIUM SPEC-SCNC: 9.9 MG/DL (ref 8.6–10.5)
CHLORIDE SERPL-SCNC: 97 MMOL/L (ref 98–107)
CHOLEST SERPL-MCNC: 130 MG/DL (ref 0–200)
CO2 SERPL-SCNC: 27.8 MMOL/L (ref 22–29)
CREAT SERPL-MCNC: 0.91 MG/DL (ref 0.57–1)
CREAT UR-MCNC: 39.5 MG/DL
DEPRECATED RDW RBC AUTO: 38.8 FL (ref 37–54)
EGFRCR SERPLBLD CKD-EPI 2021: 71 ML/MIN/1.73
EOSINOPHIL # BLD AUTO: 0.19 10*3/MM3 (ref 0–0.4)
EOSINOPHIL NFR BLD AUTO: 2 % (ref 0.3–6.2)
ERYTHROCYTE [DISTWIDTH] IN BLOOD BY AUTOMATED COUNT: 12.7 % (ref 12.3–15.4)
GLOBULIN UR ELPH-MCNC: 2.4 GM/DL
GLUCOSE SERPL-MCNC: 97 MG/DL (ref 65–99)
HBA1C MFR BLD: 6.2 % (ref 4.8–5.6)
HCT VFR BLD AUTO: 35.2 % (ref 34–46.6)
HDLC SERPL-MCNC: 53 MG/DL (ref 40–60)
HGB BLD-MCNC: 12.3 G/DL (ref 12–15.9)
IMM GRANULOCYTES # BLD AUTO: 0.03 10*3/MM3 (ref 0–0.05)
IMM GRANULOCYTES NFR BLD AUTO: 0.3 % (ref 0–0.5)
LDLC SERPL CALC-MCNC: 60 MG/DL (ref 0–100)
LDLC/HDLC SERPL: 1.12 {RATIO}
LYMPHOCYTES # BLD AUTO: 2.09 10*3/MM3 (ref 0.7–3.1)
LYMPHOCYTES NFR BLD AUTO: 22.3 % (ref 19.6–45.3)
MCH RBC QN AUTO: 28.9 PG (ref 26.6–33)
MCHC RBC AUTO-ENTMCNC: 34.9 G/DL (ref 31.5–35.7)
MCV RBC AUTO: 82.8 FL (ref 79–97)
MICROALBUMIN/CREAT UR: 32.9 MG/G
MONOCYTES # BLD AUTO: 0.76 10*3/MM3 (ref 0.1–0.9)
MONOCYTES NFR BLD AUTO: 8.1 % (ref 5–12)
NEUTROPHILS NFR BLD AUTO: 6.29 10*3/MM3 (ref 1.7–7)
NEUTROPHILS NFR BLD AUTO: 67 % (ref 42.7–76)
NRBC BLD AUTO-RTO: 0 /100 WBC (ref 0–0.2)
PLATELET # BLD AUTO: 314 10*3/MM3 (ref 140–450)
PMV BLD AUTO: 9.2 FL (ref 6–12)
POTASSIUM SERPL-SCNC: 3.7 MMOL/L (ref 3.5–5.2)
PROT SERPL-MCNC: 7 G/DL (ref 6–8.5)
RBC # BLD AUTO: 4.25 10*6/MM3 (ref 3.77–5.28)
SODIUM SERPL-SCNC: 137 MMOL/L (ref 136–145)
TRIGL SERPL-MCNC: 88 MG/DL (ref 0–150)
TSH SERPL DL<=0.05 MIU/L-ACNC: 0.41 UIU/ML (ref 0.27–4.2)
VLDLC SERPL-MCNC: 17 MG/DL (ref 5–40)
WBC NRBC COR # BLD: 9.39 10*3/MM3 (ref 3.4–10.8)

## 2022-10-24 PROCEDURE — 36415 COLL VENOUS BLD VENIPUNCTURE: CPT

## 2022-10-24 PROCEDURE — 82043 UR ALBUMIN QUANTITATIVE: CPT | Performed by: NURSE PRACTITIONER

## 2022-10-24 PROCEDURE — 82570 ASSAY OF URINE CREATININE: CPT | Performed by: NURSE PRACTITIONER

## 2022-10-24 PROCEDURE — 80061 LIPID PANEL: CPT

## 2022-10-24 PROCEDURE — 80050 GENERAL HEALTH PANEL: CPT

## 2022-10-24 PROCEDURE — 83036 HEMOGLOBIN GLYCOSYLATED A1C: CPT

## 2022-10-28 ENCOUNTER — TELEPHONE (OUTPATIENT)
Dept: FAMILY MEDICINE CLINIC | Facility: CLINIC | Age: 62
End: 2022-10-28

## 2022-10-28 ENCOUNTER — OFFICE VISIT (OUTPATIENT)
Dept: FAMILY MEDICINE CLINIC | Facility: CLINIC | Age: 62
End: 2022-10-28

## 2022-10-28 VITALS
HEART RATE: 70 BPM | OXYGEN SATURATION: 97 % | TEMPERATURE: 97.4 F | HEIGHT: 63 IN | SYSTOLIC BLOOD PRESSURE: 144 MMHG | DIASTOLIC BLOOD PRESSURE: 73 MMHG | WEIGHT: 160.8 LBS | BODY MASS INDEX: 28.49 KG/M2

## 2022-10-28 DIAGNOSIS — E78.5 HYPERLIPIDEMIA, UNSPECIFIED HYPERLIPIDEMIA TYPE: ICD-10-CM

## 2022-10-28 DIAGNOSIS — Z00.00 ANNUAL PHYSICAL EXAM: ICD-10-CM

## 2022-10-28 DIAGNOSIS — I10 ESSENTIAL HYPERTENSION: ICD-10-CM

## 2022-10-28 DIAGNOSIS — Z23 NEED FOR INFLUENZA VACCINATION: ICD-10-CM

## 2022-10-28 DIAGNOSIS — E11.9 TYPE 2 DIABETES MELLITUS WITHOUT COMPLICATION, WITHOUT LONG-TERM CURRENT USE OF INSULIN: Primary | ICD-10-CM

## 2022-10-28 DIAGNOSIS — M19.90 ARTHRITIS: ICD-10-CM

## 2022-10-28 DIAGNOSIS — E66.3 OVERWEIGHT (BMI 25.0-29.9): ICD-10-CM

## 2022-10-28 PROCEDURE — 90471 IMMUNIZATION ADMIN: CPT | Performed by: NURSE PRACTITIONER

## 2022-10-28 PROCEDURE — 99214 OFFICE O/P EST MOD 30 MIN: CPT | Performed by: NURSE PRACTITIONER

## 2022-10-28 PROCEDURE — 90686 IIV4 VACC NO PRSV 0.5 ML IM: CPT | Performed by: NURSE PRACTITIONER

## 2022-10-28 RX ORDER — MELOXICAM 15 MG/1
15 TABLET ORAL DAILY
Qty: 90 TABLET | Refills: 3 | Status: SHIPPED | OUTPATIENT
Start: 2022-10-28 | End: 2023-01-19

## 2022-10-28 NOTE — ASSESSMENT & PLAN NOTE
Patient's (Body mass index is 28.48 kg/m².) indicates that they are overweight with health conditions that include hypertension, diabetes mellitus and dyslipidemias . Weight is unchanged. BMI is is above average; BMI management plan is completed. We discussed portion control and increasing exercise.

## 2022-10-28 NOTE — PROGRESS NOTES
"Chief Complaint  Follow-up (Diabetes,last A1C 10/24/2022)    Subjective        Mahsa Spain presents to [facility]  History of Present Illness  Reviewed all recent labs and medications. Pt presents annual PE/lab review. Labs drawn 10/24/22. Pt with T2DM, htn, hyperlipidemia. Pt doing well on all medications. No acute issues or complaints. Pt with cologuard 2020, which is not on file. Will call cologuard for results. Mammogram scheduled in 2 weeks.       Objective   Vital Signs:  /73   Pulse 70   Temp 97.4 °F (36.3 °C) (Temporal)   Ht 160 cm (63\")   Wt 72.9 kg (160 lb 12.8 oz)   SpO2 97%   BMI 28.48 kg/m²   Estimated body mass index is 28.48 kg/m² as calculated from the following:    Height as of this encounter: 160 cm (63\").    Weight as of this encounter: 72.9 kg (160 lb 12.8 oz).    BMI is >= 25 and <30. (Overweight) The following options were offered after discussion;: exercise counseling/recommendations and nutrition counseling/recommendations      Physical Exam  Vitals reviewed.   Constitutional:       General: She is not in acute distress.     Appearance: Normal appearance.   HENT:      Head: Normocephalic.      Right Ear: Tympanic membrane normal.      Left Ear: Tympanic membrane normal.      Nose: Nose normal.      Mouth/Throat:      Pharynx: Oropharynx is clear. No posterior oropharyngeal erythema.   Eyes:      General: No scleral icterus.     Extraocular Movements: Extraocular movements intact.      Conjunctiva/sclera: Conjunctivae normal.      Pupils: Pupils are equal, round, and reactive to light.   Cardiovascular:      Rate and Rhythm: Normal rate and regular rhythm.      Pulses: Normal pulses.      Heart sounds: Normal heart sounds.   Pulmonary:      Effort: Pulmonary effort is normal.      Breath sounds: Normal breath sounds.   Abdominal:      General: Bowel sounds are normal.      Palpations: Abdomen is soft.   Musculoskeletal:         General: Normal " range of motion.      Cervical back: Neck supple.   Skin:     General: Skin is warm and dry.   Neurological:      Mental Status: She is alert and oriented to person, place, and time.   Psychiatric:         Mood and Affect: Mood normal.         Behavior: Behavior normal.         Thought Content: Thought content normal.         Judgment: Judgment normal.        Result Review :    Common labs    Common Labs 2/13/22 2/13/22 4/26/22 4/26/22 10/24/22 10/24/22 10/24/22 10/24/22 10/24/22    0612 0612 0832 0832 1553 1553 1553 1553 1553   Glucose  152 (A)   97       BUN  10   15       Creatinine  0.78   0.91       eGFR Non African Am  75          Sodium  138   137       Potassium  3.6   3.7       Chloride  98   97 (A)       Calcium  10.0   9.9       Albumin  4.50   4.60       Total Bilirubin  0.4   0.3       Alkaline Phosphatase  70   64       AST (SGOT)  21   23       ALT (SGPT)  22   18       WBC 8.94       9.39    Hemoglobin 14.1       12.3    Hematocrit 39.7       35.2    Platelets 361       314    Total Cholesterol   122   130      Triglycerides   115   88      HDL Cholesterol   54   53      LDL Cholesterol    47   60      Hemoglobin A1C    6.20 (A)   6.20 (A)     Microalbumin, Urine         1.3   (A) Abnormal value            Data reviewed: Consultant notes DM educator           Assessment and Plan   Diagnoses and all orders for this visit:    1. Type 2 diabetes mellitus without complication, without long-term current use of insulin (HCC) (Primary)  Assessment & Plan:  Diabetes is improving with treatment.   Continue current treatment regimen.  Diabetes will be reassessed in 6 months.      2. Overweight (BMI 25.0-29.9)  Assessment & Plan:  Patient's (Body mass index is 28.48 kg/m².) indicates that they are overweight with health conditions that include hypertension, diabetes mellitus and dyslipidemias . Weight is unchanged. BMI is is above average; BMI management plan is completed. We discussed portion control and  increasing exercise.       3. Essential hypertension  Assessment & Plan:  Hypertension is improving with treatment.  Continue current treatment regimen.  Dietary sodium restriction.  Weight loss.  Regular aerobic exercise.  Blood pressure will be reassessed next reg visit..      4. Hyperlipidemia, unspecified hyperlipidemia type  Assessment & Plan:  Lipid abnormalities are improving with treatment.  Nutritional counseling was provided. and Pharmacotherapy as ordered.  Lipids will be reassessed in 6 months.      5. Need for influenza vaccination  -     FluLaval/Fluarix/Fluzone >6 Months    6. Annual physical exam  Assessment & Plan:  Discussed age appropriate preventative counseling. Written information provided to patient. All questions answered. Pt verbalized understanding.         7. Arthritis  Assessment & Plan:  Mobic 15mg PO qd.   May take  tyl prn, avoid other nsaids while taking.       Other orders  -     meloxicam (MOBIC) 15 MG tablet; Take 1 tablet by mouth Daily.  Dispense: 90 tablet; Refill: 3           Follow Up   Return in about 6 months (around 4/28/2023), or if symptoms worsen or fail to improve.  Patient was given instructions and counseling regarding her condition or for health maintenance advice. Please see specific information pulled into the AVS if appropriate.

## 2022-10-28 NOTE — TELEPHONE ENCOUNTER
Caller: Mahsa Spain    Relationship to patient: Self    Best call back number: [phone]    Patient is needing: PATIENT CALLED IN AND SAID SHE WAS SEEN THIS MORNING AND IS REQUESTING A CALL BACK TO SEE WHEN THE NEXT TIME THAT SHE NEEDS TO HAVE LABS DRAWN WILL BE.

## 2022-10-28 NOTE — ASSESSMENT & PLAN NOTE
Hypertension is improving with treatment.  Continue current treatment regimen.  Dietary sodium restriction.  Weight loss.  Regular aerobic exercise.  Blood pressure will be reassessed next reg visit..

## 2022-11-14 ENCOUNTER — HOSPITAL ENCOUNTER (OUTPATIENT)
Dept: MAMMOGRAPHY | Facility: HOSPITAL | Age: 62
Discharge: HOME | End: 2022-11-14
Admitting: NURSE PRACTITIONER

## 2022-11-14 DIAGNOSIS — Z12.31 ENCOUNTER FOR SCREENING MAMMOGRAM FOR BREAST CANCER: ICD-10-CM

## 2022-11-14 PROCEDURE — 77063 BREAST TOMOSYNTHESIS BI: CPT

## 2022-11-14 PROCEDURE — 77067 SCR MAMMO BI INCL CAD: CPT

## 2022-11-28 RX ORDER — METOPROLOL SUCCINATE 50 MG/1
50 TABLET, EXTENDED RELEASE ORAL DAILY
Qty: 90 TABLET | Refills: 0 | Status: SHIPPED | OUTPATIENT
Start: 2022-11-28 | End: 2023-03-03

## 2022-12-06 ENCOUNTER — PREP FOR SURGERY (OUTPATIENT)
Dept: OTHER | Facility: HOSPITAL | Age: 62
End: 2022-12-06

## 2022-12-06 ENCOUNTER — OFFICE VISIT (OUTPATIENT)
Dept: ORTHOPEDIC SURGERY | Facility: CLINIC | Age: 62
End: 2022-12-06

## 2022-12-06 VITALS — BODY MASS INDEX: 28.35 KG/M2 | HEIGHT: 63 IN | WEIGHT: 160 LBS

## 2022-12-06 DIAGNOSIS — M17.12 PRIMARY OSTEOARTHRITIS OF LEFT KNEE: Primary | ICD-10-CM

## 2022-12-06 PROBLEM — M17.9 OA (OSTEOARTHRITIS) OF KNEE: Status: ACTIVE | Noted: 2022-12-06

## 2022-12-06 PROCEDURE — 99213 OFFICE O/P EST LOW 20 MIN: CPT | Performed by: ORTHOPAEDIC SURGERY

## 2022-12-06 RX ORDER — TRANEXAMIC ACID 10 MG/ML
1000 INJECTION, SOLUTION INTRAVENOUS ONCE
Status: CANCELLED | OUTPATIENT
Start: 2022-12-06 | End: 2022-12-06

## 2022-12-06 RX ORDER — CEFAZOLIN SODIUM 2 G/100ML
2 INJECTION, SOLUTION INTRAVENOUS ONCE
Status: CANCELLED | OUTPATIENT
Start: 2022-12-06 | End: 2022-12-06

## 2022-12-06 RX ORDER — POVIDONE-IODINE 10 MG/ML
SOLUTION TOPICAL ONCE
Status: CANCELLED | OUTPATIENT
Start: 2022-12-06 | End: 2022-12-06

## 2022-12-06 RX ORDER — CEFAZOLIN SODIUM IN 0.9 % NACL 3 G/100 ML
3 INTRAVENOUS SOLUTION, PIGGYBACK (ML) INTRAVENOUS ONCE
Status: CANCELLED | OUTPATIENT
Start: 2022-12-06 | End: 2022-12-06

## 2022-12-06 NOTE — PROGRESS NOTES
"Chief Complaint  Follow-up of the Left Knee     Subjective      Mahsa Spain presents to [facility] for follow up of the left knee. Patient has a history of left knee osteoarthritis. In March, patient had a steroid injection that gave her some relief from her arthritis symptoms. This injection has worn off now. Her symptoms have returned. She denies any recent injury to the knee. Patient had a steroid injection on 9/1/22.  She is here today to discuss surgery.     No Known Allergies     Social History     Socioeconomic History   • Marital status:    Tobacco Use   • Smoking status: Never   • Smokeless tobacco: Never   Vaping Use   • Vaping Use: Never used   Substance and Sexual Activity   • Alcohol use: Not Currently     Comment: Rarely drinks,    • Drug use: Never   • Sexual activity: Defer        Review of Systems     Objective   Vital Signs:   Ht 160 cm (63\")   Wt 72.6 kg (160 lb)   BMI 28.34 kg/m²       Physical Exam  Constitutional:       Appearance: Normal appearance. Patient is well-developed and normal weight.   HENT:      Head: Normocephalic.      Right Ear: Hearing and external ear normal.      Left Ear: Hearing and external ear normal.      Nose: Nose normal.   Eyes:      Conjunctiva/sclera: Conjunctivae normal.   Cardiovascular:      Rate and Rhythm: Normal rate.   Pulmonary:      Effort: Pulmonary effort is normal.      Breath sounds: No wheezing or rales.   Abdominal:      Palpations: Abdomen is soft.      Tenderness: There is no abdominal tenderness.   Musculoskeletal:      Cervical back: Normal range of motion.   Skin:     Findings: No rash.   Neurological:      Mental Status: Patient is alert and oriented to person, place, and time.   Psychiatric:         Mood and Affect: Mood and affect normal.         Judgment: Judgment normal.       Ortho Exam      LEFT KNEE: Full extension. Flexion to 135 degrees with pain. Good strength to hamstrings, quadriceps, " dorsiflexors and plantar flexors. Stable to varus/valgus stress. Stable anterior and posterior drawer. Negative Lachman. Dorsal Pedal Pulse 2+, posterior tibialis pulse 2+. Calf soft. No swelling, skin discoloration or atrophy. Crepitus noted.        Procedures      Imaging Results (Most Recent)     Procedure Component Value Units Date/Time    XR Knee 3 View Left [[id number]] Resulted: 12/06/22 0803     Updated: 12/06/22 0805           Result Review :       X-Ray Report:  Left knee(s) X-Ray  Indication: Evaluation of the left knee   AP/Lateral and Stuckey view(s)  Findings: Moderate degenerative changes.  Severe arthritis. Bone on bone Spurs.   Prior studies available for comparison: Yes           Assessment and Plan     Diagnoses and all orders for this visit:    1. Primary osteoarthritis of left knee (Primary)  -     XR Knee 3 View Left        Discussed the treatment plan with the patient. Discussed the treatment options with the patient, operative vs non-operative. The patient understands the risks and benefits of surgery and ready to proceed.     Discussed surgery., Risks/benefits discussed with patient including, but not limited to: infection, bleeding, neurovascular damage, malunion, nonunion, aesthetic deformity, need for further surgery, and death., Discussed with patient the implant type being used during surgery and patient understands and desires to proceed., Surgery pamphlet given. and Call or return if worsening symptoms.    Follow Up     Postoperatively       Patient was given instructions and counseling regarding her condition or for health maintenance advice. Please see specific information pulled into the AVS if appropriate.     Scribed for Mt Card MD by Radha Cervantes MA.  12/06/22   07:57 EST    I have personally performed the services described in this document as scribed by the above individual and it is both accurate and complete. Mt Card MD 12/06/22

## 2022-12-20 DIAGNOSIS — Z96.652 AFTERCARE FOLLOWING LEFT KNEE JOINT REPLACEMENT SURGERY: Primary | ICD-10-CM

## 2022-12-20 DIAGNOSIS — Z47.1 AFTERCARE FOLLOWING LEFT KNEE JOINT REPLACEMENT SURGERY: Primary | ICD-10-CM

## 2023-01-13 DIAGNOSIS — Z01.818 ENCOUNTER FOR PREADMISSION TESTING: Primary | ICD-10-CM

## 2023-01-19 ENCOUNTER — PRE-ADMISSION TESTING (OUTPATIENT)
Dept: PREADMISSION TESTING | Facility: HOSPITAL | Age: 63
End: 2023-01-19
Payer: COMMERCIAL

## 2023-01-19 DIAGNOSIS — M17.12 PRIMARY OSTEOARTHRITIS OF LEFT KNEE: ICD-10-CM

## 2023-01-19 DIAGNOSIS — Z01.818 ENCOUNTER FOR PREADMISSION TESTING: ICD-10-CM

## 2023-01-19 LAB
ALBUMIN SERPL-MCNC: 4.8 G/DL (ref 3.5–5.2)
ALBUMIN/GLOB SERPL: 1.5 G/DL
ALP SERPL-CCNC: 72 U/L (ref 39–117)
ALT SERPL W P-5'-P-CCNC: 15 U/L (ref 1–33)
ANION GAP SERPL CALCULATED.3IONS-SCNC: 11.6 MMOL/L (ref 5–15)
AST SERPL-CCNC: 20 U/L (ref 1–32)
BASOPHILS # BLD AUTO: 0.01 10*3/MM3 (ref 0–0.2)
BASOPHILS NFR BLD AUTO: 0.2 % (ref 0–1.5)
BILIRUB SERPL-MCNC: 0.5 MG/DL (ref 0–1.2)
BUN SERPL-MCNC: 8 MG/DL (ref 8–23)
BUN/CREAT SERPL: 12.1 (ref 7–25)
CALCIUM SPEC-SCNC: 10.4 MG/DL (ref 8.6–10.5)
CHLORIDE SERPL-SCNC: 92 MMOL/L (ref 98–107)
CO2 SERPL-SCNC: 30.4 MMOL/L (ref 22–29)
CREAT SERPL-MCNC: 0.66 MG/DL (ref 0.57–1)
DEPRECATED RDW RBC AUTO: 37.6 FL (ref 37–54)
EGFRCR SERPLBLD CKD-EPI 2021: 98.7 ML/MIN/1.73
EOSINOPHIL # BLD AUTO: 0.07 10*3/MM3 (ref 0–0.4)
EOSINOPHIL NFR BLD AUTO: 1.1 % (ref 0.3–6.2)
ERYTHROCYTE [DISTWIDTH] IN BLOOD BY AUTOMATED COUNT: 12.3 % (ref 12.3–15.4)
GLOBULIN UR ELPH-MCNC: 3.2 GM/DL
GLUCOSE SERPL-MCNC: 110 MG/DL (ref 65–99)
HCT VFR BLD AUTO: 39.6 % (ref 34–46.6)
HGB BLD-MCNC: 13.9 G/DL (ref 12–15.9)
IMM GRANULOCYTES # BLD AUTO: 0.01 10*3/MM3 (ref 0–0.05)
IMM GRANULOCYTES NFR BLD AUTO: 0.2 % (ref 0–0.5)
INR PPP: 0.96 (ref 0.86–1.15)
LYMPHOCYTES # BLD AUTO: 1.11 10*3/MM3 (ref 0.7–3.1)
LYMPHOCYTES NFR BLD AUTO: 16.7 % (ref 19.6–45.3)
MCH RBC QN AUTO: 29.2 PG (ref 26.6–33)
MCHC RBC AUTO-ENTMCNC: 35.1 G/DL (ref 31.5–35.7)
MCV RBC AUTO: 83.2 FL (ref 79–97)
MONOCYTES # BLD AUTO: 0.48 10*3/MM3 (ref 0.1–0.9)
MONOCYTES NFR BLD AUTO: 7.2 % (ref 5–12)
NEUTROPHILS NFR BLD AUTO: 4.95 10*3/MM3 (ref 1.7–7)
NEUTROPHILS NFR BLD AUTO: 74.6 % (ref 42.7–76)
NRBC BLD AUTO-RTO: 0 /100 WBC (ref 0–0.2)
PLATELET # BLD AUTO: 354 10*3/MM3 (ref 140–450)
PMV BLD AUTO: 8.7 FL (ref 6–12)
POTASSIUM SERPL-SCNC: 3.8 MMOL/L (ref 3.5–5.2)
PROT SERPL-MCNC: 8 G/DL (ref 6–8.5)
PROTHROMBIN TIME: 12.9 SECONDS (ref 11.8–14.9)
RBC # BLD AUTO: 4.76 10*6/MM3 (ref 3.77–5.28)
SODIUM SERPL-SCNC: 134 MMOL/L (ref 136–145)
WBC NRBC COR # BLD: 6.63 10*3/MM3 (ref 3.4–10.8)

## 2023-01-19 PROCEDURE — 93010 ELECTROCARDIOGRAM REPORT: CPT | Performed by: INTERNAL MEDICINE

## 2023-01-19 PROCEDURE — 36415 COLL VENOUS BLD VENIPUNCTURE: CPT

## 2023-01-19 PROCEDURE — 93005 ELECTROCARDIOGRAM TRACING: CPT

## 2023-01-19 PROCEDURE — 85610 PROTHROMBIN TIME: CPT

## 2023-01-19 PROCEDURE — 83036 HEMOGLOBIN GLYCOSYLATED A1C: CPT

## 2023-01-19 PROCEDURE — 85025 COMPLETE CBC W/AUTO DIFF WBC: CPT

## 2023-01-19 PROCEDURE — 80053 COMPREHEN METABOLIC PANEL: CPT

## 2023-01-19 RX ORDER — VIT C/B6/B5/MAGNESIUM/HERB 173 50-5-6-5MG
500 CAPSULE ORAL DAILY
COMMUNITY

## 2023-01-19 RX ORDER — HYDROXYZINE 50 MG/1
50 TABLET, FILM COATED ORAL NIGHTLY PRN
COMMUNITY
Start: 2023-01-03

## 2023-01-19 RX ORDER — TETRACYCLINE HCL 500 MG
500 CAPSULE ORAL DAILY
COMMUNITY

## 2023-01-19 RX ORDER — LISINOPRIL AND HYDROCHLOROTHIAZIDE 25; 20 MG/1; MG/1
1 TABLET ORAL DAILY
COMMUNITY
End: 2023-03-17

## 2023-01-19 NOTE — DISCHARGE INSTRUCTIONS
IMPORTANT INSTRUCTIONS - PRE-ADMISSION TESTING  DO NOT EAT OR CHEW anything after midnight the night before your procedure.    You may have CLEAR liquids up to 3 hours prior to ARRIVAL time.   Take the following medications the morning of your procedure with JUST A SIP OF WATER:  Metoprolol, Atorvastatin    DO NOT BRING your medications to the hospital with you, UNLESS something has changed since your PRE-Admission Testing appointment.  Hold all vitamins, supplements, and NSAIDS (Non- steroidal anti-inflammatory meds) for one week prior to surgery (you MAY take Tylenol or Acetaminophen).  If you are diabetic, check your blood sugar the morning of your procedure. If it is less than 70 or if you are feeling symptomatic, call the following number for further instructions: [phone]  Make sure you have a ride home and have someone who will stay with you the day of your procedure after you go home.  If you have any questions, please call your Pre-Admission Testing Nurse, Praveena at [phone].   Per anesthesia request, do not smoke for 24 hours before your procedure or as instructed by your surgeon.     You will receive a call the day before surgery with an arrival time.    Clear Liquid Diet        Find out when you need to start a clear liquid diet.   Think of “clear liquids” as anything you could read a newspaper through. This includes things like water, broth, sports drinks, or tea WITHOUT any kind of milk or cream.           Once you are told to start a clear liquid diet, only drink these things until 3 hours before arrival to the hospital or when the hospital says to stop. Total volume limitation: 8 oz.       Clear liquids you CAN drink:   Water   Clear broth: beef, chicken, vegetable, or bone broth with nothing in it   Gatorade   Lemonade or Steven-aid   Soda   Tea, coffee (NO cream or honey)   Jell-O (without fruit)   Popsicles (without fruit or cream)   Italian ices   Juice without pulp: apple, white, grape    You may use salt, pepper, and sugar    Do NOT drink:   Milk or cream   Soy milk, almond milk, coconut milk, or other non-dairy drinks and   creamers   Milkshakes or smoothies   Tomato juice   Orange juice   Grapefruit juice   Cream soups or any other than broth         Clear Liquid Diet:  Do NOT eat any solid food.  Do NOT eat or suck on mints or candy.  Do NOT chew gum.  Do NOT drink thick liquids like milk or juice with pulp in it.  Do NOT add milk, cream, or anything like soy milk or almond milk to coffee or tea.

## 2023-01-20 ENCOUNTER — ANESTHESIA EVENT (OUTPATIENT)
Dept: PERIOP | Facility: HOSPITAL | Age: 63
End: 2023-01-20
Payer: COMMERCIAL

## 2023-01-20 LAB — HBA1C MFR BLD: 6.2 % (ref 4.8–5.6)

## 2023-01-21 LAB — QT INTERVAL: 377 MS

## 2023-01-27 ENCOUNTER — ANESTHESIA (OUTPATIENT)
Dept: PERIOP | Facility: HOSPITAL | Age: 63
End: 2023-01-27
Payer: COMMERCIAL

## 2023-01-27 ENCOUNTER — APPOINTMENT (OUTPATIENT)
Dept: GENERAL RADIOLOGY | Facility: HOSPITAL | Age: 63
End: 2023-01-27
Payer: COMMERCIAL

## 2023-01-27 ENCOUNTER — HOSPITAL ENCOUNTER (OUTPATIENT)
Facility: HOSPITAL | Age: 63
Discharge: HOME | End: 2023-01-28
Attending: ORTHOPAEDIC SURGERY | Admitting: ORTHOPAEDIC SURGERY
Payer: COMMERCIAL

## 2023-01-27 DIAGNOSIS — Z78.9 DECREASED ACTIVITIES OF DAILY LIVING (ADL): ICD-10-CM

## 2023-01-27 DIAGNOSIS — M17.12 PRIMARY OSTEOARTHRITIS OF LEFT KNEE: ICD-10-CM

## 2023-01-27 DIAGNOSIS — R26.2 DIFFICULTY WALKING: Primary | ICD-10-CM

## 2023-01-27 LAB
GLUCOSE BLDC GLUCOMTR-MCNC: 125 MG/DL (ref 70–99)
GLUCOSE BLDC GLUCOMTR-MCNC: 127 MG/DL (ref 70–99)
GLUCOSE BLDC GLUCOMTR-MCNC: 166 MG/DL (ref 70–99)

## 2023-01-27 PROCEDURE — 25010000002 EPINEPHRINE 1 MG/ML SOLUTION: Performed by: ORTHOPAEDIC SURGERY

## 2023-01-27 PROCEDURE — 82962 GLUCOSE BLOOD TEST: CPT

## 2023-01-27 PROCEDURE — 25010000002 DEXAMETHASONE PER 1 MG: Performed by: NURSE ANESTHETIST, CERTIFIED REGISTERED

## 2023-01-27 PROCEDURE — C1776 JOINT DEVICE (IMPLANTABLE): HCPCS | Performed by: ORTHOPAEDIC SURGERY

## 2023-01-27 PROCEDURE — 27447 TOTAL KNEE ARTHROPLASTY: CPT | Performed by: ORTHOPAEDIC SURGERY

## 2023-01-27 PROCEDURE — 94761 N-INVAS EAR/PLS OXIMETRY MLT: CPT

## 2023-01-27 PROCEDURE — 97161 PT EVAL LOW COMPLEX 20 MIN: CPT

## 2023-01-27 PROCEDURE — 25010000002 CEFAZOLIN IN DEXTROSE 2-4 GM/100ML-% SOLUTION: Performed by: ORTHOPAEDIC SURGERY

## 2023-01-27 PROCEDURE — 25010000002 ONDANSETRON PER 1 MG: Performed by: NURSE ANESTHETIST, CERTIFIED REGISTERED

## 2023-01-27 PROCEDURE — 25010000002 KETOROLAC TROMETHAMINE PER 15 MG: Performed by: ORTHOPAEDIC SURGERY

## 2023-01-27 PROCEDURE — 25010000002 MIDAZOLAM PER 1 MG: Performed by: ANESTHESIOLOGY

## 2023-01-27 PROCEDURE — C1713 ANCHOR/SCREW BN/BN,TIS/BN: HCPCS | Performed by: ORTHOPAEDIC SURGERY

## 2023-01-27 PROCEDURE — 25010000002 PROPOFOL 10 MG/ML EMULSION: Performed by: NURSE ANESTHETIST, CERTIFIED REGISTERED

## 2023-01-27 PROCEDURE — 73560 X-RAY EXAM OF KNEE 1 OR 2: CPT

## 2023-01-27 PROCEDURE — 25010000002 MORPHINE PER 10 MG: Performed by: ORTHOPAEDIC SURGERY

## 2023-01-27 PROCEDURE — 25010000002 FENTANYL CITRATE (PF) 50 MCG/ML SOLUTION: Performed by: NURSE ANESTHETIST, CERTIFIED REGISTERED

## 2023-01-27 PROCEDURE — 25010000002 ROPIVACAINE PER 1 MG: Performed by: ORTHOPAEDIC SURGERY

## 2023-01-27 PROCEDURE — 94799 UNLISTED PULMONARY SVC/PX: CPT

## 2023-01-27 PROCEDURE — 99203 OFFICE O/P NEW LOW 30 MIN: CPT | Performed by: INTERNAL MEDICINE

## 2023-01-27 DEVICE — COMP FEM/KN VANGUARD INTLK CR 62.5MM NS LT: Type: IMPLANTABLE DEVICE | Site: KNEE | Status: FUNCTIONAL

## 2023-01-27 DEVICE — BEAR TIB/KN VANGUARD AS 12X67MM NS: Type: IMPLANTABLE DEVICE | Site: KNEE | Status: FUNCTIONAL

## 2023-01-27 DEVICE — TRY TIB CRUC 67MM: Type: IMPLANTABLE DEVICE | Site: KNEE | Status: FUNCTIONAL

## 2023-01-27 DEVICE — PAT 3PEG STD 8X31MM: Type: IMPLANTABLE DEVICE | Site: KNEE | Status: FUNCTIONAL

## 2023-01-27 DEVICE — CMT BONE PALACOS R HI/VISC 1X40: Type: IMPLANTABLE DEVICE | Site: KNEE | Status: FUNCTIONAL

## 2023-01-27 DEVICE — CAP TOTL KN CMT PRIMARY: Type: IMPLANTABLE DEVICE | Site: KNEE | Status: FUNCTIONAL

## 2023-01-27 RX ORDER — ONDANSETRON 2 MG/ML
4 INJECTION INTRAMUSCULAR; INTRAVENOUS EVERY 6 HOURS PRN
Status: DISCONTINUED | OUTPATIENT
Start: 2023-01-27 | End: 2023-01-28 | Stop reason: HOSPADM

## 2023-01-27 RX ORDER — LISINOPRIL 20 MG/1
20 TABLET ORAL
Status: DISCONTINUED | OUTPATIENT
Start: 2023-01-28 | End: 2023-01-28 | Stop reason: HOSPADM

## 2023-01-27 RX ORDER — ATORVASTATIN CALCIUM 10 MG/1
10 TABLET, FILM COATED ORAL NIGHTLY
Status: DISCONTINUED | OUTPATIENT
Start: 2023-01-27 | End: 2023-01-28 | Stop reason: HOSPADM

## 2023-01-27 RX ORDER — DEXMEDETOMIDINE HYDROCHLORIDE 100 UG/ML
INJECTION, SOLUTION INTRAVENOUS AS NEEDED
Status: DISCONTINUED | OUTPATIENT
Start: 2023-01-27 | End: 2023-01-27 | Stop reason: SURG

## 2023-01-27 RX ORDER — ONDANSETRON 4 MG/1
4 TABLET, FILM COATED ORAL EVERY 6 HOURS PRN
Status: DISCONTINUED | OUTPATIENT
Start: 2023-01-27 | End: 2023-01-28 | Stop reason: HOSPADM

## 2023-01-27 RX ORDER — PROMETHAZINE HYDROCHLORIDE 12.5 MG/1
25 TABLET ORAL ONCE AS NEEDED
Status: DISCONTINUED | OUTPATIENT
Start: 2023-01-27 | End: 2023-01-27 | Stop reason: HOSPADM

## 2023-01-27 RX ORDER — TRANEXAMIC ACID 10 MG/ML
1000 INJECTION, SOLUTION INTRAVENOUS ONCE
Status: COMPLETED | OUTPATIENT
Start: 2023-01-27 | End: 2023-01-27

## 2023-01-27 RX ORDER — NALOXONE HCL 0.4 MG/ML
0.4 VIAL (ML) INJECTION
Status: DISCONTINUED | OUTPATIENT
Start: 2023-01-27 | End: 2023-01-28 | Stop reason: HOSPADM

## 2023-01-27 RX ORDER — NICOTINE POLACRILEX 4 MG
15 LOZENGE BUCCAL
Status: DISCONTINUED | OUTPATIENT
Start: 2023-01-27 | End: 2023-01-28 | Stop reason: HOSPADM

## 2023-01-27 RX ORDER — HYDROCODONE BITARTRATE AND ACETAMINOPHEN 7.5; 325 MG/1; MG/1
2 TABLET ORAL EVERY 4 HOURS PRN
Status: DISCONTINUED | OUTPATIENT
Start: 2023-01-27 | End: 2023-01-28 | Stop reason: HOSPADM

## 2023-01-27 RX ORDER — PROPOFOL 10 MG/ML
VIAL (ML) INTRAVENOUS AS NEEDED
Status: DISCONTINUED | OUTPATIENT
Start: 2023-01-27 | End: 2023-01-27 | Stop reason: SURG

## 2023-01-27 RX ORDER — ACETAMINOPHEN 500 MG
1000 TABLET ORAL ONCE
Status: COMPLETED | OUTPATIENT
Start: 2023-01-27 | End: 2023-01-27

## 2023-01-27 RX ORDER — OXYCODONE HYDROCHLORIDE 5 MG/1
5 TABLET ORAL
Status: DISCONTINUED | OUTPATIENT
Start: 2023-01-27 | End: 2023-01-27 | Stop reason: HOSPADM

## 2023-01-27 RX ORDER — GABAPENTIN 300 MG/1
300 CAPSULE ORAL ONCE
Status: COMPLETED | OUTPATIENT
Start: 2023-01-27 | End: 2023-01-27

## 2023-01-27 RX ORDER — HYDROCODONE BITARTRATE AND ACETAMINOPHEN 7.5; 325 MG/1; MG/1
1 TABLET ORAL EVERY 4 HOURS PRN
Status: DISCONTINUED | OUTPATIENT
Start: 2023-01-27 | End: 2023-01-28 | Stop reason: HOSPADM

## 2023-01-27 RX ORDER — PROMETHAZINE HYDROCHLORIDE 25 MG/1
25 SUPPOSITORY RECTAL ONCE AS NEEDED
Status: DISCONTINUED | OUTPATIENT
Start: 2023-01-27 | End: 2023-01-27 | Stop reason: HOSPADM

## 2023-01-27 RX ORDER — ONDANSETRON 2 MG/ML
INJECTION INTRAMUSCULAR; INTRAVENOUS AS NEEDED
Status: DISCONTINUED | OUTPATIENT
Start: 2023-01-27 | End: 2023-01-27 | Stop reason: SURG

## 2023-01-27 RX ORDER — DEXTROSE MONOHYDRATE 25 G/50ML
25 INJECTION, SOLUTION INTRAVENOUS
Status: DISCONTINUED | OUTPATIENT
Start: 2023-01-27 | End: 2023-01-28 | Stop reason: HOSPADM

## 2023-01-27 RX ORDER — KETOROLAC TROMETHAMINE 15 MG/ML
15 INJECTION, SOLUTION INTRAMUSCULAR; INTRAVENOUS EVERY 6 HOURS SCHEDULED
Status: COMPLETED | OUTPATIENT
Start: 2023-01-27 | End: 2023-01-28

## 2023-01-27 RX ORDER — CELECOXIB 100 MG/1
200 CAPSULE ORAL ONCE
Status: COMPLETED | OUTPATIENT
Start: 2023-01-27 | End: 2023-01-27

## 2023-01-27 RX ORDER — INSULIN LISPRO 100 [IU]/ML
2-7 INJECTION, SOLUTION INTRAVENOUS; SUBCUTANEOUS
Status: DISCONTINUED | OUTPATIENT
Start: 2023-01-27 | End: 2023-01-28 | Stop reason: HOSPADM

## 2023-01-27 RX ORDER — MIDAZOLAM HYDROCHLORIDE 1 MG/ML
2 INJECTION INTRAMUSCULAR; INTRAVENOUS ONCE
Status: COMPLETED | OUTPATIENT
Start: 2023-01-27 | End: 2023-01-27

## 2023-01-27 RX ORDER — ACETAMINOPHEN 500 MG
1000 TABLET ORAL EVERY 8 HOURS
Status: DISCONTINUED | OUTPATIENT
Start: 2023-01-27 | End: 2023-01-28 | Stop reason: HOSPADM

## 2023-01-27 RX ORDER — KETAMINE HCL IN NACL, ISO-OSM 100MG/10ML
SYRINGE (ML) INJECTION AS NEEDED
Status: DISCONTINUED | OUTPATIENT
Start: 2023-01-27 | End: 2023-01-27 | Stop reason: SURG

## 2023-01-27 RX ORDER — MAGNESIUM HYDROXIDE 1200 MG/15ML
LIQUID ORAL AS NEEDED
Status: DISCONTINUED | OUTPATIENT
Start: 2023-01-27 | End: 2023-01-27 | Stop reason: HOSPADM

## 2023-01-27 RX ORDER — AMOXICILLIN 250 MG
2 CAPSULE ORAL 2 TIMES DAILY
Status: DISCONTINUED | OUTPATIENT
Start: 2023-01-27 | End: 2023-01-28 | Stop reason: HOSPADM

## 2023-01-27 RX ORDER — EPHEDRINE SULFATE 50 MG/ML
INJECTION, SOLUTION INTRAVENOUS AS NEEDED
Status: DISCONTINUED | OUTPATIENT
Start: 2023-01-27 | End: 2023-01-27 | Stop reason: SURG

## 2023-01-27 RX ORDER — CEFAZOLIN SODIUM IN 0.9 % NACL 3 G/100 ML
3 INTRAVENOUS SOLUTION, PIGGYBACK (ML) INTRAVENOUS ONCE
Status: DISCONTINUED | OUTPATIENT
Start: 2023-01-27 | End: 2023-01-27 | Stop reason: DRUGHIGH

## 2023-01-27 RX ORDER — CEFAZOLIN SODIUM 2 G/100ML
2 INJECTION, SOLUTION INTRAVENOUS EVERY 8 HOURS
Status: COMPLETED | OUTPATIENT
Start: 2023-01-27 | End: 2023-01-28

## 2023-01-27 RX ORDER — MEPERIDINE HYDROCHLORIDE 25 MG/ML
12.5 INJECTION INTRAMUSCULAR; INTRAVENOUS; SUBCUTANEOUS
Status: DISCONTINUED | OUTPATIENT
Start: 2023-01-27 | End: 2023-01-27 | Stop reason: HOSPADM

## 2023-01-27 RX ORDER — ENOXAPARIN SODIUM 100 MG/ML
40 INJECTION SUBCUTANEOUS DAILY
Status: DISCONTINUED | OUTPATIENT
Start: 2023-01-28 | End: 2023-01-28 | Stop reason: HOSPADM

## 2023-01-27 RX ORDER — DEXAMETHASONE SODIUM PHOSPHATE 4 MG/ML
INJECTION, SOLUTION INTRA-ARTICULAR; INTRALESIONAL; INTRAMUSCULAR; INTRAVENOUS; SOFT TISSUE AS NEEDED
Status: DISCONTINUED | OUTPATIENT
Start: 2023-01-27 | End: 2023-01-27 | Stop reason: SURG

## 2023-01-27 RX ORDER — FERROUS SULFATE 325(65) MG
325 TABLET ORAL
Status: DISCONTINUED | OUTPATIENT
Start: 2023-01-28 | End: 2023-01-28 | Stop reason: HOSPADM

## 2023-01-27 RX ORDER — METOPROLOL SUCCINATE 50 MG/1
50 TABLET, EXTENDED RELEASE ORAL DAILY
Status: DISCONTINUED | OUTPATIENT
Start: 2023-01-28 | End: 2023-01-28 | Stop reason: HOSPADM

## 2023-01-27 RX ORDER — CEFAZOLIN SODIUM 2 G/100ML
2 INJECTION, SOLUTION INTRAVENOUS ONCE
Status: COMPLETED | OUTPATIENT
Start: 2023-01-27 | End: 2023-01-27

## 2023-01-27 RX ORDER — LIDOCAINE HYDROCHLORIDE 20 MG/ML
INJECTION, SOLUTION EPIDURAL; INFILTRATION; INTRACAUDAL; PERINEURAL AS NEEDED
Status: DISCONTINUED | OUTPATIENT
Start: 2023-01-27 | End: 2023-01-27 | Stop reason: SURG

## 2023-01-27 RX ORDER — TRANEXAMIC ACID 10 MG/ML
1000 INJECTION, SOLUTION INTRAVENOUS ONCE
Status: DISCONTINUED | OUTPATIENT
Start: 2023-01-27 | End: 2023-01-27 | Stop reason: HOSPADM

## 2023-01-27 RX ORDER — TRANEXAMIC ACID 100 MG/ML
INJECTION, SOLUTION INTRAVENOUS AS NEEDED
Status: DISCONTINUED | OUTPATIENT
Start: 2023-01-27 | End: 2023-01-27 | Stop reason: SURG

## 2023-01-27 RX ORDER — FENTANYL CITRATE 50 UG/ML
INJECTION, SOLUTION INTRAMUSCULAR; INTRAVENOUS AS NEEDED
Status: DISCONTINUED | OUTPATIENT
Start: 2023-01-27 | End: 2023-01-27 | Stop reason: SURG

## 2023-01-27 RX ORDER — BUPIVACAINE HYDROCHLORIDE AND EPINEPHRINE 5; 5 MG/ML; UG/ML
INJECTION, SOLUTION EPIDURAL; INTRACAUDAL; PERINEURAL
Status: COMPLETED | OUTPATIENT
Start: 2023-01-27 | End: 2023-01-27

## 2023-01-27 RX ORDER — SODIUM CHLORIDE, SODIUM LACTATE, POTASSIUM CHLORIDE, CALCIUM CHLORIDE 600; 310; 30; 20 MG/100ML; MG/100ML; MG/100ML; MG/100ML
9 INJECTION, SOLUTION INTRAVENOUS CONTINUOUS PRN
Status: DISCONTINUED | OUTPATIENT
Start: 2023-01-27 | End: 2023-01-28 | Stop reason: HOSPADM

## 2023-01-27 RX ORDER — ONDANSETRON 2 MG/ML
4 INJECTION INTRAMUSCULAR; INTRAVENOUS ONCE AS NEEDED
Status: DISCONTINUED | OUTPATIENT
Start: 2023-01-27 | End: 2023-01-27 | Stop reason: HOSPADM

## 2023-01-27 RX ORDER — HYDROCHLOROTHIAZIDE 25 MG/1
25 TABLET ORAL
Status: DISCONTINUED | OUTPATIENT
Start: 2023-01-27 | End: 2023-01-28 | Stop reason: HOSPADM

## 2023-01-27 RX ORDER — FAMOTIDINE 20 MG/1
40 TABLET, FILM COATED ORAL DAILY
Status: DISCONTINUED | OUTPATIENT
Start: 2023-01-27 | End: 2023-01-28 | Stop reason: HOSPADM

## 2023-01-27 RX ORDER — SODIUM CHLORIDE, SODIUM LACTATE, POTASSIUM CHLORIDE, CALCIUM CHLORIDE 600; 310; 30; 20 MG/100ML; MG/100ML; MG/100ML; MG/100ML
100 INJECTION, SOLUTION INTRAVENOUS CONTINUOUS
Status: DISCONTINUED | OUTPATIENT
Start: 2023-01-27 | End: 2023-01-27

## 2023-01-27 RX ADMIN — KETOROLAC TROMETHAMINE 15 MG: 15 INJECTION, SOLUTION INTRAMUSCULAR; INTRAVENOUS at 17:22

## 2023-01-27 RX ADMIN — ATORVASTATIN CALCIUM 10 MG: 10 TABLET, FILM COATED ORAL at 21:29

## 2023-01-27 RX ADMIN — FAMOTIDINE 40 MG: 20 TABLET, FILM COATED ORAL at 15:06

## 2023-01-27 RX ADMIN — CEFAZOLIN SODIUM 2 G: 2 INJECTION, SOLUTION INTRAVENOUS at 10:25

## 2023-01-27 RX ADMIN — SENNOSIDES AND DOCUSATE SODIUM 2 TABLET: 8.6; 5 TABLET ORAL at 21:30

## 2023-01-27 RX ADMIN — FENTANYL CITRATE 25 MCG: 50 INJECTION, SOLUTION INTRAMUSCULAR; INTRAVENOUS at 10:49

## 2023-01-27 RX ADMIN — BUPIVACAINE HYDROCHLORIDE AND EPINEPHRINE BITARTRATE 25 ML: 5; .005 INJECTION, SOLUTION EPIDURAL; INTRACAUDAL; PERINEURAL at 09:47

## 2023-01-27 RX ADMIN — ACETAMINOPHEN 1000 MG: 500 TABLET ORAL at 15:06

## 2023-01-27 RX ADMIN — CELECOXIB 200 MG: 100 CAPSULE ORAL at 08:57

## 2023-01-27 RX ADMIN — DEXMEDETOMIDINE HYDROCHLORIDE 5 MCG: 100 INJECTION, SOLUTION, CONCENTRATE INTRAVENOUS at 10:51

## 2023-01-27 RX ADMIN — ACETAMINOPHEN 1000 MG: 500 TABLET ORAL at 08:57

## 2023-01-27 RX ADMIN — KETOROLAC TROMETHAMINE 15 MG: 15 INJECTION, SOLUTION INTRAMUSCULAR; INTRAVENOUS at 15:06

## 2023-01-27 RX ADMIN — DEXMEDETOMIDINE HYDROCHLORIDE 10 MCG: 100 INJECTION, SOLUTION, CONCENTRATE INTRAVENOUS at 11:33

## 2023-01-27 RX ADMIN — OXYCODONE HYDROCHLORIDE 5 MG: 5 TABLET ORAL at 12:53

## 2023-01-27 RX ADMIN — PROPOFOL 200 MG: 10 INJECTION, EMULSION INTRAVENOUS at 10:18

## 2023-01-27 RX ADMIN — Medication 20 MG: at 10:46

## 2023-01-27 RX ADMIN — MIDAZOLAM HYDROCHLORIDE 2 MG: 2 INJECTION, SOLUTION INTRAMUSCULAR; INTRAVENOUS at 09:33

## 2023-01-27 RX ADMIN — DEXMEDETOMIDINE HYDROCHLORIDE 5 MCG: 100 INJECTION, SOLUTION, CONCENTRATE INTRAVENOUS at 11:12

## 2023-01-27 RX ADMIN — CEFAZOLIN SODIUM 2 G: 2 INJECTION, SOLUTION INTRAVENOUS at 17:23

## 2023-01-27 RX ADMIN — HYDROCHLOROTHIAZIDE 25 MG: 25 TABLET ORAL at 15:47

## 2023-01-27 RX ADMIN — EPHEDRINE SULFATE 10 MG: 50 INJECTION INTRAVENOUS at 10:36

## 2023-01-27 RX ADMIN — ONDANSETRON 4 MG: 2 INJECTION INTRAMUSCULAR; INTRAVENOUS at 10:18

## 2023-01-27 RX ADMIN — TRANEXAMIC ACID 1000 MG: 100 INJECTION, SOLUTION INTRAVENOUS at 11:38

## 2023-01-27 RX ADMIN — LIDOCAINE HYDROCHLORIDE 100 MG: 20 INJECTION, SOLUTION EPIDURAL; INFILTRATION; INTRACAUDAL; PERINEURAL at 10:18

## 2023-01-27 RX ADMIN — FENTANYL CITRATE 50 MCG: 50 INJECTION, SOLUTION INTRAMUSCULAR; INTRAVENOUS at 10:55

## 2023-01-27 RX ADMIN — SODIUM CHLORIDE, POTASSIUM CHLORIDE, SODIUM LACTATE AND CALCIUM CHLORIDE 9 ML/HR: 600; 310; 30; 20 INJECTION, SOLUTION INTRAVENOUS at 08:57

## 2023-01-27 RX ADMIN — GABAPENTIN 300 MG: 300 CAPSULE ORAL at 08:57

## 2023-01-27 RX ADMIN — TRANEXAMIC ACID 1000 MG: 10 INJECTION, SOLUTION INTRAVENOUS at 09:33

## 2023-01-27 RX ADMIN — SODIUM CHLORIDE, POTASSIUM CHLORIDE, SODIUM LACTATE AND CALCIUM CHLORIDE 100 ML/HR: 600; 310; 30; 20 INJECTION, SOLUTION INTRAVENOUS at 13:47

## 2023-01-27 RX ADMIN — FENTANYL CITRATE 25 MCG: 50 INJECTION, SOLUTION INTRAMUSCULAR; INTRAVENOUS at 10:50

## 2023-01-27 RX ADMIN — DEXMEDETOMIDINE HYDROCHLORIDE 10 MCG: 100 INJECTION, SOLUTION, CONCENTRATE INTRAVENOUS at 10:43

## 2023-01-27 RX ADMIN — ACETAMINOPHEN 1000 MG: 500 TABLET ORAL at 21:30

## 2023-01-27 RX ADMIN — DEXAMETHASONE SODIUM PHOSPHATE 4 MG: 4 INJECTION, SOLUTION INTRA-ARTICULAR; INTRALESIONAL; INTRAMUSCULAR; INTRAVENOUS; SOFT TISSUE at 10:18

## 2023-01-27 NOTE — ANESTHESIA POSTPROCEDURE EVALUATION
Patient: Mahsa Spain    Procedure Summary     Date: 01/27/23 Room / Location: [facility] OR 03 / [facility] MAIN OR    Anesthesia Start: 1015 Anesthesia Stop: 1158    Procedure: LEFT TOTAL KNEE ARTHROPLASTY (Left: Knee) Diagnosis:       Primary osteoarthritis of left knee      (Primary osteoarthritis of left knee [M17.12])    Surgeons: Mt Card MD Provider: Tyshawn Mckinley MD    Anesthesia Type: general with block ASA Status: 2          Anesthesia Type: general with block    Vitals  Vitals Value Taken Time   /59 01/27/23 1236   Temp 36.8 °C (98.3 °F) 01/27/23 1155   Pulse 83 01/27/23 1245   Resp 16 01/27/23 1205   SpO2 94 % 01/27/23 1245   Vitals shown include unvalidated device data.        Post Anesthesia Care and Evaluation    Patient location during evaluation: bedside  Patient participation: complete - patient participated  Level of consciousness: awake  Pain management: adequate    Airway patency: patent  Anesthetic complications: No anesthetic complications  PONV Status: none  Cardiovascular status: acceptable and stable  Respiratory status: acceptable  Hydration status: acceptable    Comments: An Anesthesiologist personally participated in the most demanding procedures (including induction and emergence if applicable) in the anesthesia plan, monitored the course of anesthesia administration at frequent intervals and remained physically present and available for immediate diagnosis and treatment of emergencies.

## 2023-01-27 NOTE — ANESTHESIA PROCEDURE NOTES
Peripheral Block      Patient reassessed immediately prior to procedure    Patient location during procedure: pre-op  Start time: 1/27/2023 9:45 AM  Stop time: 1/27/2023 9:48 AM  Reason for block: at surgeon's request and post-op pain management  Performed by  Anesthesiologist: Tyshawn Mckinley MD  Preanesthetic Checklist  Completed: patient identified, IV checked, site marked, risks and benefits discussed, surgical consent, monitors and equipment checked, pre-op evaluation and timeout performed  Prep:  Pt Position: supine  Sterile barriers:cap, washed/disinfected hands, mask and gloves  Prep: ChloraPrep  Patient monitoring: blood pressure monitoring, continuous pulse oximetry and EKG  Procedure    Sedation: yes    Guidance:ultrasound guided    ULTRASOUND INTERPRETATION.  Using ultrasound guidance a 20 G gauge needle was placed in close proximity to the nerve, at which point, under ultrasound guidance anesthetic was injected in the area of the nerve and spread of the anesthesia was seen on ultrasound in close proximity thereto.  There were no abnormalities seen on ultrasound; a digital image was taken; and the patient tolerated the procedure with no complications. Images:still images obtained, printed/placed on chart    Laterality:left  Block Type:adductor canal block  Injection Technique:single-shot  Needle Type:echogenic  Needle Gauge:20 G (4in)  Resistance on Injection: none    Medications Used: bupivacaine-EPINEPHrine PF (MARCAINE w/EPI) 0.5% -[id number] injection - Injection   25 mL - 1/27/2023 9:47:00 AM      Post Assessment  Injection Assessment: negative aspiration for heme, no paresthesia on injection and incremental injection  Patient Tolerance:comfortable throughout block  Complications:no  Additional Notes  The block requested by the referring physician for management of postoperative pain, or pain related to a procedure. Ultrasound guidance (deemed medically necessary). Painless injection, pt was awake  and conversant during the procedure without complications. Needle and surrounding structures visualized throughout procedure. No adverse reactions or complications seen during this period. Post-procedure image showed no signs of complication, and anatomy was consistent with an uncomplicated nerve blockade.

## 2023-01-27 NOTE — ANESTHESIA PREPROCEDURE EVALUATION
Anesthesia Evaluation     Patient summary reviewed and Nursing notes reviewed   no history of anesthetic complications:  NPO Solid Status: > 8 hours  NPO Liquid Status: > 2 hours           Airway   Mallampati: II  TM distance: >3 FB  Neck ROM: full  No difficulty expected  Dental      Pulmonary - negative pulmonary ROS and normal exam    breath sounds clear to auscultation  Cardiovascular - normal exam  Exercise tolerance: good (4-7 METS)    ECG reviewed  Beta blocker given within 24 hours of surgery  Rhythm: regular  Rate: normal    (+) hypertension, hyperlipidemia,       Neuro/Psych  (+) psychiatric history,    GI/Hepatic/Renal/Endo    (+)   diabetes mellitus type 2,     Musculoskeletal     Abdominal    Substance History - negative use     OB/GYN negative ob/gyn ROS         Other   arthritis,      ROS/Med Hx Other: PROCEDURE. HX HTN, DM.  EKG 01/19/23. NO CP.SOA. METS>4.  EKG NSR borderline Twave ab                   Anesthesia Plan    ASA 2     general with block     (Patient understands anesthesia not responsible for dental damage.)  intravenous induction     Anesthetic plan, risks, benefits, and alternatives have been provided, discussed and informed consent has been obtained with: patient.    Use of blood products discussed with patient .   Plan discussed with CRNA.        CODE STATUS:

## 2023-01-28 VITALS
HEIGHT: 63 IN | WEIGHT: 158.73 LBS | DIASTOLIC BLOOD PRESSURE: 64 MMHG | RESPIRATION RATE: 16 BRPM | SYSTOLIC BLOOD PRESSURE: 139 MMHG | HEART RATE: 72 BPM | TEMPERATURE: 97.8 F | BODY MASS INDEX: 28.12 KG/M2 | OXYGEN SATURATION: 100 %

## 2023-01-28 LAB
ANION GAP SERPL CALCULATED.3IONS-SCNC: 9.7 MMOL/L (ref 5–15)
BUN SERPL-MCNC: 13 MG/DL (ref 8–23)
BUN/CREAT SERPL: 17.3 (ref 7–25)
CALCIUM SPEC-SCNC: 9.2 MG/DL (ref 8.6–10.5)
CHLORIDE SERPL-SCNC: 92 MMOL/L (ref 98–107)
CO2 SERPL-SCNC: 28.3 MMOL/L (ref 22–29)
CREAT SERPL-MCNC: 0.75 MG/DL (ref 0.57–1)
DEPRECATED RDW RBC AUTO: 37.6 FL (ref 37–54)
EGFRCR SERPLBLD CKD-EPI 2021: 89.6 ML/MIN/1.73
ERYTHROCYTE [DISTWIDTH] IN BLOOD BY AUTOMATED COUNT: 12.4 % (ref 12.3–15.4)
GLUCOSE BLDC GLUCOMTR-MCNC: 107 MG/DL (ref 70–99)
GLUCOSE SERPL-MCNC: 160 MG/DL (ref 65–99)
HCT VFR BLD AUTO: 29.8 % (ref 34–46.6)
HGB BLD-MCNC: 10.4 G/DL (ref 12–15.9)
MCH RBC QN AUTO: 28.8 PG (ref 26.6–33)
MCHC RBC AUTO-ENTMCNC: 34.9 G/DL (ref 31.5–35.7)
MCV RBC AUTO: 82.5 FL (ref 79–97)
PLATELET # BLD AUTO: 258 10*3/MM3 (ref 140–450)
PMV BLD AUTO: 8.8 FL (ref 6–12)
POTASSIUM SERPL-SCNC: 3.7 MMOL/L (ref 3.5–5.2)
RBC # BLD AUTO: 3.61 10*6/MM3 (ref 3.77–5.28)
SODIUM SERPL-SCNC: 130 MMOL/L (ref 136–145)
WBC NRBC COR # BLD: 12.44 10*3/MM3 (ref 3.4–10.8)

## 2023-01-28 PROCEDURE — 97110 THERAPEUTIC EXERCISES: CPT

## 2023-01-28 PROCEDURE — 25010000002 ENOXAPARIN PER 10 MG: Performed by: ORTHOPAEDIC SURGERY

## 2023-01-28 PROCEDURE — 97116 GAIT TRAINING THERAPY: CPT

## 2023-01-28 PROCEDURE — 85027 COMPLETE CBC AUTOMATED: CPT | Performed by: ORTHOPAEDIC SURGERY

## 2023-01-28 PROCEDURE — 25010000002 CEFAZOLIN IN DEXTROSE 2-4 GM/100ML-% SOLUTION: Performed by: ORTHOPAEDIC SURGERY

## 2023-01-28 PROCEDURE — 97535 SELF CARE MNGMENT TRAINING: CPT

## 2023-01-28 PROCEDURE — 97165 OT EVAL LOW COMPLEX 30 MIN: CPT

## 2023-01-28 PROCEDURE — 25010000002 KETOROLAC TROMETHAMINE PER 15 MG: Performed by: ORTHOPAEDIC SURGERY

## 2023-01-28 PROCEDURE — 82962 GLUCOSE BLOOD TEST: CPT

## 2023-01-28 PROCEDURE — 80048 BASIC METABOLIC PNL TOTAL CA: CPT | Performed by: ORTHOPAEDIC SURGERY

## 2023-01-28 PROCEDURE — 97530 THERAPEUTIC ACTIVITIES: CPT

## 2023-01-28 PROCEDURE — 99213 OFFICE O/P EST LOW 20 MIN: CPT | Performed by: INTERNAL MEDICINE

## 2023-01-28 RX ORDER — HYDROCODONE BITARTRATE AND ACETAMINOPHEN 7.5; 325 MG/1; MG/1
1-2 TABLET ORAL EVERY 4 HOURS PRN
Qty: 40 TABLET | Refills: 0 | Status: SHIPPED | OUTPATIENT
Start: 2023-01-28

## 2023-01-28 RX ADMIN — KETOROLAC TROMETHAMINE 15 MG: 15 INJECTION, SOLUTION INTRAMUSCULAR; INTRAVENOUS at 00:17

## 2023-01-28 RX ADMIN — KETOROLAC TROMETHAMINE 15 MG: 15 INJECTION, SOLUTION INTRAMUSCULAR; INTRAVENOUS at 05:15

## 2023-01-28 RX ADMIN — METOPROLOL SUCCINATE 50 MG: 50 TABLET, EXTENDED RELEASE ORAL at 08:06

## 2023-01-28 RX ADMIN — FAMOTIDINE 40 MG: 20 TABLET, FILM COATED ORAL at 08:06

## 2023-01-28 RX ADMIN — FERROUS SULFATE TAB 325 MG (65 MG ELEMENTAL FE) 325 MG: 325 (65 FE) TAB at 08:06

## 2023-01-28 RX ADMIN — LISINOPRIL 20 MG: 20 TABLET ORAL at 08:06

## 2023-01-28 RX ADMIN — ENOXAPARIN SODIUM 40 MG: 100 INJECTION SUBCUTANEOUS at 08:06

## 2023-01-28 RX ADMIN — HYDROCHLOROTHIAZIDE 25 MG: 25 TABLET ORAL at 08:06

## 2023-01-28 RX ADMIN — SENNOSIDES AND DOCUSATE SODIUM 2 TABLET: 8.6; 5 TABLET ORAL at 08:06

## 2023-01-28 RX ADMIN — HYDROCODONE BITARTRATE AND ACETAMINOPHEN 1 TABLET: 7.5; 325 TABLET ORAL at 05:15

## 2023-01-28 RX ADMIN — CEFAZOLIN SODIUM 2 G: 2 INJECTION, SOLUTION INTRAVENOUS at 02:19

## 2023-01-28 RX ADMIN — HYDROCODONE BITARTRATE AND ACETAMINOPHEN 1 TABLET: 7.5; 325 TABLET ORAL at 10:07

## 2023-02-02 ENCOUNTER — TELEPHONE (OUTPATIENT)
Dept: ORTHOPEDIC SURGERY | Facility: CLINIC | Age: 63
End: 2023-02-02
Payer: COMMERCIAL

## 2023-02-02 NOTE — TELEPHONE ENCOUNTER
PATIENT CALLED AND WAS CONCERNED ABOUT A SMALL AREA OF BLOOD ON INCISION.  PATIENT STATES THAT IT IS ABOUT A QUARTER SIZE AND NOT ACTIVELY BLEEDING.  PATIENT STATES SHE CLEANED THE AREA AND REDRESSED KNEE.  SHE WILL OBSERVE AND WILL CALL IF SHE HAS ANY NEW CONCERNS OR ISSUES

## 2023-02-09 ENCOUNTER — OFFICE VISIT (OUTPATIENT)
Dept: ORTHOPEDIC SURGERY | Facility: CLINIC | Age: 63
End: 2023-02-09
Payer: COMMERCIAL

## 2023-02-09 VITALS — BODY MASS INDEX: 27.11 KG/M2 | OXYGEN SATURATION: 98 % | HEART RATE: 85 BPM | HEIGHT: 63 IN | WEIGHT: 153 LBS

## 2023-02-09 DIAGNOSIS — Z47.1 AFTERCARE FOLLOWING LEFT KNEE JOINT REPLACEMENT SURGERY: ICD-10-CM

## 2023-02-09 DIAGNOSIS — Z96.652 AFTERCARE FOLLOWING LEFT KNEE JOINT REPLACEMENT SURGERY: ICD-10-CM

## 2023-02-09 DIAGNOSIS — M25.562 LEFT KNEE PAIN, UNSPECIFIED CHRONICITY: Primary | ICD-10-CM

## 2023-02-09 PROCEDURE — 99024 POSTOP FOLLOW-UP VISIT: CPT | Performed by: PHYSICIAN ASSISTANT

## 2023-02-09 RX ORDER — ACETAMINOPHEN 500 MG
500 TABLET ORAL EVERY 6 HOURS PRN
COMMUNITY

## 2023-02-09 NOTE — PROGRESS NOTES
"Chief Complaint  Follow-up of the Left Knee and Suture / Staple Removal    Subjective      Mahsa Spain presents to [facility] for left total knee arthroplasty performed on 1/27/2023 by Dr. Card. She presents today with use of cane. Reports that her pain is \"fine\". States she has not been taking post-op narcotics. She has started outpatient PT at [facility] in [place]. She has been icing her knee as needed for pain or swelling.     Objective   No Known Allergies    Vital Signs:   Pulse 85   Ht 160 cm (63\")   Wt 69.4 kg (153 lb)   SpO2 98%   BMI 27.10 kg/m²       Physical Exam    Constitutional: Awake, alert. Well nourished appearance.    Integumentary: Warm, dry, intact. No obvious rashes.    HENT: Atraumatic, normocephalic.   Respiratory: Non labored respirations .   Cardiovascular: Intact peripheral pulses.    Psychiatric: Normal mood and affect. A&O X3    Ortho Exam  Left knee: Staples removed in office today. Well healing surgical scar noted, which is clean, dry, and intact. There is no evidence of wound dehiscence, surrounding erythema, warmth, or drainage. Moderate edema. Patella is well-tracking. - 3 degrees of extension and flexion to 85 degrees. Full plantarflexion and dorsiflexion of the ankle. Sensation intact to light touch. Distal neurovascular intact. Ambulatory with assistance of cane.     Imaging Results (Most Recent)     Procedure Component Value Units Date/Time    XR Knee 3 View Left [[id number]] Resulted: 02/09/23 1010     Updated: 02/09/23 1010    Narrative:      X-Ray Report:  Study: X-rays ordered, taken in the office, and reviewed today.   Site: Left knee Xray  Indication: TKA  View: AP/Lateral, Standing and Sunrise view(s)  Findings: Intact left total knee arthroplasty. No evidence of hardware   malfunction.   Prior studies available for comparison: yes                     Assessment and Plan   Problem List Items Addressed This Visit    None  Visit Diagnoses  "    Left knee pain, unspecified chronicity    -  Primary    Relevant Orders    XR Knee 3 View Left (Completed)    Aftercare following left knee joint replacement surgery            Follow Up   Return in about 4 weeks (around 3/9/2023).  Patient is a non-smoker.  Did not discuss options for smoking cessation.    Patient Instructions   X-rays taken and reviewed, showing intact hardware.    Staples removed in office and Steri-Strips applied.  Patient educated on incision care.  Please keep incision clean and dry.  Do not soak or submerge in water until incision is fully healed.  Do not apply creams or lotions over the incision.  Please allow Steri-Strips to fall off on their own within 7 to 10 days.    Continue icing and elevation of the knee as needed to help with pain and swelling.  Ice knee up to 3 or 4 times daily for no longer than 15 to 20 minutes at a time.    Continue PT to progress ROM, strength, and weightbearing status.    Follow-up in 4 weeks. Repeat x-rays not needed at this visit.  Please call with questions or concerns.      Patient was given instructions and counseling regarding her condition or for health maintenance advice. Please see specific information pulled into the AVS if appropriate.

## 2023-03-03 RX ORDER — METOPROLOL SUCCINATE 50 MG/1
50 TABLET, EXTENDED RELEASE ORAL DAILY
Qty: 90 TABLET | Refills: 0 | Status: SHIPPED | OUTPATIENT
Start: 2023-03-03

## 2023-03-09 ENCOUNTER — OFFICE VISIT (OUTPATIENT)
Dept: ORTHOPEDIC SURGERY | Facility: CLINIC | Age: 63
End: 2023-03-09
Payer: COMMERCIAL

## 2023-03-09 VITALS — HEIGHT: 63 IN | OXYGEN SATURATION: 99 % | BODY MASS INDEX: 27.68 KG/M2 | WEIGHT: 156.2 LBS | HEART RATE: 80 BPM

## 2023-03-09 DIAGNOSIS — Z47.1 AFTERCARE FOLLOWING LEFT KNEE JOINT REPLACEMENT SURGERY: Primary | ICD-10-CM

## 2023-03-09 DIAGNOSIS — Z96.652 AFTERCARE FOLLOWING LEFT KNEE JOINT REPLACEMENT SURGERY: Primary | ICD-10-CM

## 2023-03-09 PROCEDURE — 99024 POSTOP FOLLOW-UP VISIT: CPT | Performed by: PHYSICIAN ASSISTANT

## 2023-03-09 NOTE — PROGRESS NOTES
"Chief Complaint  Follow-up of the Left Knee    Subjective      Mahsa Spain presents to [facility] for follow-up of left total knee arthroplasty performed on 1/27/2023 by Dr. Lee.  She presents today independently ambulatory without use of assistive device.  States that she is doing very well.  She remains in outpatient physical therapy at [facility] in [place].  She feels she would benefit from continued physical therapy.  She reports knee flexion beyond 120 degrees, although reports no change in her knee extension.    Objective   No Known Allergies    Vital Signs:   Pulse 80   Ht 160 cm (63\")   Wt 70.9 kg (156 lb 3.2 oz)   SpO2 99%   BMI 27.67 kg/m²       Physical Exam    Constitutional: Awake, alert. Well nourished appearance.    Integumentary: Warm, dry, intact. No obvious rashes.    HENT: Atraumatic, normocephalic.   Respiratory: Non labored respirations .   Cardiovascular: Intact peripheral pulses.    Psychiatric: Normal mood and affect. A&O X3    Ortho Exam  Left knee: Skin is warm, dry, and intact.  Well-healed surgical scar noted.  There is very small residual scab formation.  Mild edema.  There is no surrounding erythema, warmth, or drainage.  Patella is well tracking and knee is stable to varus and valgus stress.  -3 degrees of extension and flexion to 125 degrees.  Full plantarflexion and dorsiflexion of the ankle.  Sensation intact light touch.  Smooth sit to stand transition.  Patient is fully weightbearing with nonantalgic gait.    Imaging Results (Most Recent)     None                    Assessment and Plan   Problem List Items Addressed This Visit    None  Visit Diagnoses     Aftercare following left knee joint replacement surgery    -  Primary          Follow Up   Return in about 6 weeks (around 4/20/2023).  Patient is a non-smoker.  Did not discuss options for smoking cessation.    Patient Instructions   Patient is doing very well. Advised to continue PT to " completion to progress strength and ROM. Continue home exercises.     Continue icing knee as needed up to 4 times daily for no longer than 15-20 mins at a time.     Follow-up in 6 weeks. Repeat x-rays needed. Call with changes or concerns.       Patient was given instructions and counseling regarding her condition or for health maintenance advice. Please see specific information pulled into the AVS if appropriate.

## 2023-03-17 RX ORDER — LISINOPRIL AND HYDROCHLOROTHIAZIDE 25; 20 MG/1; MG/1
TABLET ORAL
Qty: 90 TABLET | Refills: 0 | Status: SHIPPED | OUTPATIENT
Start: 2023-03-17

## 2023-04-03 RX ORDER — LANCETS 30 GAUGE
EACH MISCELLANEOUS
Qty: 100 EACH | Refills: 11 | Status: SHIPPED | OUTPATIENT
Start: 2023-04-03

## 2023-04-04 ENCOUNTER — TELEPHONE (OUTPATIENT)
Dept: ORTHOPEDIC SURGERY | Facility: CLINIC | Age: 63
End: 2023-04-04
Payer: COMMERCIAL

## 2023-04-04 RX ORDER — AMOXICILLIN 500 MG/1
CAPSULE ORAL
Qty: 4 CAPSULE | Refills: 0 | Status: SHIPPED | OUTPATIENT
Start: 2023-04-04

## 2023-04-04 NOTE — TELEPHONE ENCOUNTER
Caller: Mahsa Spain    Relationship: Self    Best call back number:     What is the best time to reach you: ANY    Who are you requesting to speak with (clinical staff, provider,  specific staff member): CLINICAL    What was the call regarding: PATIENT HAD KNEE SURGERY IN January AND HAS TO HAVE TOOTH PULLED Thursday AND WASN'T SURE IF NEEDED ANTIBIOTICS OR ANYTHING.  PLEASE CONTACT HER     Do you require a callback: YES

## 2023-04-24 ENCOUNTER — TELEPHONE (OUTPATIENT)
Dept: FAMILY MEDICINE CLINIC | Facility: CLINIC | Age: 63
End: 2023-04-24
Payer: COMMERCIAL

## 2023-04-24 NOTE — TELEPHONE ENCOUNTER
Pt had labs done in January, she had A1C, CMP, CBC, PT INR, and BMP. Do you want her to do more blood work?

## 2023-04-24 NOTE — TELEPHONE ENCOUNTER
Caller: Mahsa Spain    Relationship: Self    Best call back number:[phone] (Mobile)    What orders are you requesting (i.e. lab or imaging): BLOOD WORK    In what timeframe would the patient need to come in: AS SOON AS POSSIBLE

## 2023-04-25 NOTE — TELEPHONE ENCOUNTER
Left message for patient that she does not need new labs until July.  She can call back if she has more questions.

## 2023-04-28 ENCOUNTER — OFFICE VISIT (OUTPATIENT)
Dept: ORTHOPEDIC SURGERY | Facility: CLINIC | Age: 63
End: 2023-04-28
Payer: COMMERCIAL

## 2023-04-28 VITALS — WEIGHT: 156 LBS | HEART RATE: 70 BPM | OXYGEN SATURATION: 97 % | HEIGHT: 63 IN | BODY MASS INDEX: 27.64 KG/M2

## 2023-04-28 DIAGNOSIS — Z96.652 AFTERCARE FOLLOWING LEFT KNEE JOINT REPLACEMENT SURGERY: Primary | ICD-10-CM

## 2023-04-28 DIAGNOSIS — Z47.1 AFTERCARE FOLLOWING LEFT KNEE JOINT REPLACEMENT SURGERY: Primary | ICD-10-CM

## 2023-04-28 NOTE — PROGRESS NOTES
"Chief Complaint  Follow-up of the Left Knee    Subjective      Mahsa Spain presents to [facility] for follow-up of left total knee arthroplasty performed on 1/27/2023 by Dr. Card.  Patient presents independently ambulatory without use of assistive device.  Reports she was discharged from outpatient physical therapy approximately 3 weeks ago.  She has returned to baseline activities/ADLs without complaints of pain, swelling, or other limitations.    Objective   No Known Allergies    Vital Signs:   Pulse 70   Ht 160 cm (63\")   Wt 70.8 kg (156 lb)   SpO2 97%   BMI 27.63 kg/m²       Physical Exam    Constitutional: Awake, alert. Well nourished appearance.    Integumentary: Warm, dry, intact. No obvious rashes.    HENT: Atraumatic, normocephalic.   Respiratory: Non labored respirations .   Cardiovascular: Intact peripheral pulses.    Psychiatric: Normal mood and affect. A&O X3    Ortho Exam  Left knee: Skin is warm, dry, and intact.  Well-healed surgical scar noted.  Minimal edema.  Patella is well tracking and knee is stable to varus and valgus stress.  Full knee extension and flexion 125 degrees.  Full plantarflexion dorsiflexion of the ankle.  Sensation intact light touch.  Distal neurovascular intact.  Smooth sit to stand transition.  Patient is fully weightbearing with nonantalgic gait.    Imaging Results (Most Recent)     Procedure Component Value Units Date/Time    XR Knee 3 View Left [[id number]] Resulted: 05/01/23 1310     Updated: 05/01/23 1310    Narrative:      X-Ray Report:  Study: X-rays ordered, taken in the office, and reviewed today.   Site: Left knee xray  Indication: TKA  View: AP/Lateral, Standing and [place] view(s)  Findings: Intact left total knee arthroplasty. No evidence of hardware   malfunction.   Prior studies available for comparison: yes                       Assessment and Plan   Problem List Items Addressed This Visit    None  Visit Diagnoses     " ----- Message from Priyanka Bernardo sent at 2/22/2017  4:05 PM CST -----  Contact: [phone]  Pt said her heart battery went off 2 times this morning and it went off this evening  she wants to know what is going with it pt came in on the 2/14 Please call pt at your earliest convenience.  Thanks !   Aftercare following left knee joint replacement surgery    -  Primary    Relevant Orders    XR Knee 3 View Left (Completed)          Follow Up   Return in about 9 months (around 1/28/2024).  Patient is a non-smoker.  Did not discuss tobacco cessation options.      Patient Instructions   X-rays reviewed, showing hardware intact. Continue home exercise program to progress strength and ROM. Continue icing knee as needed up to 3-4 times daily for no longer than 15 to 20 minutes at a time.    Continue with lifelong antibiotic prophylaxis with dental procedures following total joint replacement.    Follow-up in 9 months. Call sooner, if needed with any changes or concerns. Repeat x-rays.       Patient was given instructions and counseling regarding her condition or for health maintenance advice. Please see specific information pulled into the AVS if appropriate.

## 2023-05-08 ENCOUNTER — OFFICE VISIT (OUTPATIENT)
Dept: FAMILY MEDICINE CLINIC | Facility: CLINIC | Age: 63
End: 2023-05-08
Payer: COMMERCIAL

## 2023-05-08 VITALS
TEMPERATURE: 97.7 F | HEIGHT: 63 IN | WEIGHT: 158.5 LBS | HEART RATE: 81 BPM | BODY MASS INDEX: 28.08 KG/M2 | DIASTOLIC BLOOD PRESSURE: 60 MMHG | SYSTOLIC BLOOD PRESSURE: 155 MMHG | OXYGEN SATURATION: 98 %

## 2023-05-08 DIAGNOSIS — J01.90 ACUTE NON-RECURRENT SINUSITIS, UNSPECIFIED LOCATION: Primary | ICD-10-CM

## 2023-05-08 RX ORDER — AZITHROMYCIN 250 MG/1
TABLET, FILM COATED ORAL
Qty: 6 TABLET | Refills: 0 | Status: SHIPPED | OUTPATIENT
Start: 2023-05-08

## 2023-05-08 RX ORDER — METHYLPREDNISOLONE 4 MG/1
TABLET ORAL
Qty: 1 EACH | Refills: 0 | Status: SHIPPED | OUTPATIENT
Start: 2023-05-08

## 2023-05-08 NOTE — PROGRESS NOTES
Chief Complaint   Patient presents with   • Nasal Congestion     Head congested, feels like she has a sinus infection, symptoms started on Saturday.    • Facial Pain     Sinus pressure and pain.        Subjective          Mahsa Spain presents to [facility]    History of Present Illness  She is here to be seen for nasal congestion, facial pain, and sinus pressure. She has clear, yellow, or green nasal discharge when she blows her nose. She has had the symptoms for a few days now.         Past History:  Medical History: has a past medical history of Arthritis, Diabetes mellitus, Hemorrhoids, HTN (hypertension), Hyperlipidemia, and Primary osteoarthritis of left knee (02/13/2018).   Surgical History: has a past surgical history that includes Colonoscopy; Hip fracture surgery (Right); Hysterectomy; Other surgical history (Right); Other surgical history (2009); Knee surgery (Right, 2013); Total knee arthroplasty (Right, 2014); and Total knee arthroplasty (Left, 1/27/2023).   Family History: family history includes Arthritis in her brother and mother; Depression in her mother; Hypertension in her mother; Osteoarthritis in her mother; Osteoporosis in her mother.   Social History: reports that she has never smoked. She has never used smokeless tobacco. She reports that she does not currently use alcohol. She reports that she does not use drugs.  Allergies: Patient has no known allergies.  (Not in a hospital admission)       Social History     Socioeconomic History   • Marital status:    Tobacco Use   • Smoking status: Never   • Smokeless tobacco: Never   Vaping Use   • Vaping Use: Never used   Substance and Sexual Activity   • Alcohol use: Not Currently     Comment: Rarely drinks,    • Drug use: Never   • Sexual activity: Not Currently     Partners: Male     Birth control/protection: Hysterectomy       There are no preventive care reminders to display for this  "patient.    Objective     Vital Signs:   /60 (BP Location: Right arm, Patient Position: Sitting)   Pulse 81   Temp 97.7 °F (36.5 °C) (Infrared)   Ht 160 cm (63\")   Wt 71.9 kg (158 lb 8 oz)   SpO2 98%   BMI 28.08 kg/m²       Physical Exam  Constitutional:       Appearance: Normal appearance.   HENT:      Nose: Nose normal.      Mouth/Throat:      Mouth: Mucous membranes are moist.   Cardiovascular:      Rate and Rhythm: Normal rate and regular rhythm.      Pulses: Normal pulses.      Heart sounds: Normal heart sounds.   Pulmonary:      Effort: Pulmonary effort is normal.      Breath sounds: Normal breath sounds.   Skin:     General: Skin is warm and dry.   Neurological:      General: No focal deficit present.      Mental Status: She is alert and oriented to person, place, and time.   Psychiatric:         Mood and Affect: Mood normal.         Behavior: Behavior normal.          Review of Systems   HENT: Positive for congestion and sinus pressure.    All other systems reviewed and are negative.       Result Review :                 Assessment and Plan    Diagnoses and all orders for this visit:    1. Acute non-recurrent sinusitis, unspecified location (Primary)  -     azithromycin (Zithromax Z-Henry) 250 MG tablet; Take 2 tablets by mouth on day 1, then 1 tablet daily on days 2-5  Dispense: 6 tablet; Refill: 0  -     methylPREDNISolone (MEDROL) 4 MG dose pack; Take as directed on package instructions.  Dispense: 1 each; Refill: 0          Pt thought to be clinically stable at this time.    Follow Up   No follow-ups on file.  Patient was given instructions and counseling regarding her condition or for health maintenance advice. Please see specific information pulled into the AVS if appropriate.       "

## 2023-05-12 ENCOUNTER — TELEPHONE (OUTPATIENT)
Dept: FAMILY MEDICINE CLINIC | Facility: CLINIC | Age: 63
End: 2023-05-12

## 2023-05-12 NOTE — TELEPHONE ENCOUNTER
Caller: Mahsa Spain    Relationship: Self    Best call back number: [phone]  What medication are you requesting: MEDICATION FOR COUGH     What are your current symptoms: WET COUGH    How long have you been experiencing symptoms: SINCE WEDNESDAY    Have you had these symptoms before:    [x] Yes  [] No    Have you been treated for these symptoms before:   [x] Yes  [] No    If a prescription is needed, what is your preferred pharmacy and phone number: [id number] [address] [place] [facility] [address] - [phone] [facility] [phone]      Additional notes: PATIENT IS FINISHING UP HER ANTIBIOTIC AND STEROID MEDICATIONS AND HAS DEVELOPED A WET COUGH AND WOULD LIKE A PRESCRIPTION SENT OR AN OTC MEDICATION RECOMMENDED THAT WILL NOT INTERFERE WITH HER REGULAR  MEDICATIONS PLEASE CONTACT AND ADVISE

## 2023-05-12 NOTE — TELEPHONE ENCOUNTER
Patient seen in office for sick visit 05/08/23, given zpack and steroid    Asking for something to help with wet cough

## 2023-06-19 RX ORDER — LISINOPRIL AND HYDROCHLOROTHIAZIDE 25; 20 MG/1; MG/1
TABLET ORAL
Qty: 90 TABLET | Refills: 1 | Status: SHIPPED | OUTPATIENT
Start: 2023-06-19

## 2023-08-14 ENCOUNTER — TELEPHONE (OUTPATIENT)
Dept: FAMILY MEDICINE CLINIC | Facility: CLINIC | Age: 63
End: 2023-08-14
Payer: COMMERCIAL

## 2023-08-14 DIAGNOSIS — Z12.31 ENCOUNTER FOR SCREENING MAMMOGRAM FOR MALIGNANT NEOPLASM OF BREAST: ICD-10-CM

## 2023-08-14 DIAGNOSIS — Z11.59 NEED FOR HEPATITIS C SCREENING TEST: Primary | ICD-10-CM

## 2023-08-14 DIAGNOSIS — E11.9 TYPE 2 DIABETES MELLITUS WITHOUT COMPLICATION, WITHOUT LONG-TERM CURRENT USE OF INSULIN: ICD-10-CM

## 2023-08-14 NOTE — TELEPHONE ENCOUNTER
Left message for patient that labs and mammogram were ordered.  Also asked her to make follow up appt sometime in October.  OK for HUB to schedule appt.

## 2023-08-14 NOTE — TELEPHONE ENCOUNTER
Caller: Mahsa Spain    Relationship: Self    Best call back number: [phone]    What orders are you requesting (i.e. lab or imaging): LABS FOR CHOLESTEROL  AND DIABETES AND MAMMOGRAM     In what timeframe would the patient need to come in: ASAP     Where will you receive your lab/imaging services: LABS IN OFFICE AND MAMMOGRAM WILL BE DONE AT [facility]     Additional notes: PLEASE ADVISE WHEN ORDERS ARE PLACED

## 2023-08-14 NOTE — TELEPHONE ENCOUNTER
Patient requesting lab orders and mammogram order.  Last seen 10/28/23.  Can have patient follow up after labs.  Pended for you.

## 2023-09-05 RX ORDER — METFORMIN HYDROCHLORIDE 500 MG/1
500 TABLET, EXTENDED RELEASE ORAL
Qty: 90 TABLET | Refills: 1 | Status: SHIPPED | OUTPATIENT
Start: 2023-09-05

## 2023-09-05 RX ORDER — ATORVASTATIN CALCIUM 10 MG/1
10 TABLET, FILM COATED ORAL DAILY
Qty: 90 TABLET | Refills: 1 | Status: SHIPPED | OUTPATIENT
Start: 2023-09-05

## 2023-09-05 NOTE — TELEPHONE ENCOUNTER
Med refill, patient last seen 7/20/23 w Dr. Duran.  Last appt w you 10/28/22.  A1c ordered on 8/14/23 but not completed yet.

## 2023-09-15 ENCOUNTER — OFFICE VISIT (OUTPATIENT)
Dept: FAMILY MEDICINE CLINIC | Facility: CLINIC | Age: 63
End: 2023-09-15
Payer: COMMERCIAL

## 2023-09-15 VITALS
OXYGEN SATURATION: 93 % | WEIGHT: 160.6 LBS | DIASTOLIC BLOOD PRESSURE: 65 MMHG | HEART RATE: 73 BPM | SYSTOLIC BLOOD PRESSURE: 148 MMHG | TEMPERATURE: 98.4 F | BODY MASS INDEX: 28.46 KG/M2 | HEIGHT: 63 IN

## 2023-09-15 DIAGNOSIS — M54.2 CERVICAL PAIN: ICD-10-CM

## 2023-09-15 DIAGNOSIS — Z00.00 ANNUAL PHYSICAL EXAM: Primary | ICD-10-CM

## 2023-09-15 DIAGNOSIS — E11.9 TYPE 2 DIABETES MELLITUS WITHOUT COMPLICATION, WITHOUT LONG-TERM CURRENT USE OF INSULIN: ICD-10-CM

## 2023-09-15 DIAGNOSIS — E78.5 HYPERLIPIDEMIA, UNSPECIFIED HYPERLIPIDEMIA TYPE: ICD-10-CM

## 2023-09-15 DIAGNOSIS — Z12.11 SCREENING FOR COLON CANCER: ICD-10-CM

## 2023-09-15 DIAGNOSIS — I10 ESSENTIAL HYPERTENSION: ICD-10-CM

## 2023-09-15 RX ORDER — LISINOPRIL AND HYDROCHLOROTHIAZIDE 25; 20 MG/1; MG/1
1 TABLET ORAL DAILY
Qty: 90 TABLET | Refills: 1 | Status: SHIPPED | OUTPATIENT
Start: 2023-09-15

## 2023-09-15 RX ORDER — METOPROLOL SUCCINATE 100 MG/1
100 TABLET, EXTENDED RELEASE ORAL DAILY
Qty: 90 TABLET | Refills: 3 | Status: SHIPPED | OUTPATIENT
Start: 2023-09-15

## 2023-09-15 RX ORDER — METOPROLOL SUCCINATE 50 MG/1
50 TABLET, EXTENDED RELEASE ORAL DAILY
Qty: 90 TABLET | Refills: 0 | Status: SHIPPED | OUTPATIENT
Start: 2023-09-15 | End: 2023-09-15 | Stop reason: ALTCHOICE

## 2023-09-15 RX ORDER — MULTIPLE VITAMINS W/ MINERALS TAB 9MG-400MCG
1 TAB ORAL DAILY
COMMUNITY

## 2023-09-15 RX ORDER — METFORMIN HYDROCHLORIDE 500 MG/1
500 TABLET, EXTENDED RELEASE ORAL
Qty: 90 TABLET | Refills: 1 | Status: CANCELLED | OUTPATIENT
Start: 2023-09-15

## 2023-09-15 RX ORDER — DICLOFENAC SODIUM 75 MG/1
75 TABLET, DELAYED RELEASE ORAL 2 TIMES DAILY
Qty: 60 TABLET | Refills: 2 | Status: SHIPPED | OUTPATIENT
Start: 2023-09-15

## 2023-09-15 RX ORDER — CYCLOBENZAPRINE HCL 10 MG
10 TABLET ORAL 3 TIMES DAILY PRN
Qty: 30 TABLET | Refills: 1 | Status: SHIPPED | OUTPATIENT
Start: 2023-09-15

## 2023-09-15 NOTE — PROGRESS NOTES
"Chief Complaint  Neck Pain (Started a week ago .), Annual Exam, and Med Refill    Subjective        Mahsa Spain presents to [facility]  History of Present Illness  Pt presents for annual PE/labs/refills. Pt is due for colon cancer screening. Would like cologuard ordered .She has done this in the past. Denies any lower GI issues/bowel problems.    Pt c/o cervical pain x 1 week. Denies any falls or injuries. Has not taken anything to treat. Pain is worse on R side, intermittent, worse with movement.    Reviewed all recent labs and medications.    Objective   Vital Signs:  /65   Pulse 73   Temp 98.4 °F (36.9 °C) (Temporal)   Ht 160 cm (63\")   Wt 72.8 kg (160 lb 9.6 oz)   SpO2 93%   BMI 28.45 kg/m²   Estimated body mass index is 28.45 kg/m² as calculated from the following:    Height as of this encounter: 160 cm (63\").    Weight as of this encounter: 72.8 kg (160 lb 9.6 oz).       BMI is >= 25 and <30. (Overweight) The following options were offered after discussion;: exercise counseling/recommendations and nutrition counseling/recommendations      Physical Exam  Vitals reviewed.   Constitutional:       General: She is not in acute distress.     Appearance: Normal appearance.   HENT:      Head: Normocephalic.      Right Ear: Tympanic membrane normal.      Left Ear: Tympanic membrane normal.      Nose: Nose normal.      Mouth/Throat:      Pharynx: Oropharynx is clear. No posterior oropharyngeal erythema.   Eyes:      General: No scleral icterus.     Extraocular Movements: Extraocular movements intact.      Conjunctiva/sclera: Conjunctivae normal.      Pupils: Pupils are equal, round, and reactive to light.   Cardiovascular:      Rate and Rhythm: Normal rate and regular rhythm.      Pulses: Normal pulses.      Heart sounds: Normal heart sounds.   Pulmonary:      Effort: Pulmonary effort is normal.      Breath sounds: Normal breath sounds.   Abdominal:      General: Bowel " sounds are normal.      Palpations: Abdomen is soft.   Musculoskeletal:         General: Normal range of motion.      Cervical back: Neck supple. Tenderness present. Pain with movement present.   Skin:     General: Skin is warm and dry.   Neurological:      Mental Status: She is alert and oriented to person, place, and time.   Psychiatric:         Mood and Affect: Mood normal.         Behavior: Behavior normal.         Thought Content: Thought content normal.         Judgment: Judgment normal.      Result Review :    Common labs          10/24/2022    15:53 1/19/2023    08:57 1/28/2023    04:22   Common Labs   Glucose 97  110  160    BUN 15  8  13    Creatinine 0.91  0.66  0.75    Sodium 137  134  130    Potassium 3.7  3.8  3.7    Chloride 97  92  92    Calcium 9.9  10.4  9.2    Albumin 4.60  4.8     Total Bilirubin 0.3  0.5     Alkaline Phosphatase 64  72     AST (SGOT) 23  20     ALT (SGPT) 18  15     WBC 9.39  6.63  12.44    Hemoglobin 12.3  13.9  10.4    Hematocrit 35.2  39.6  29.8    Platelets 314  354  258    Total Cholesterol 130      Triglycerides 88      HDL Cholesterol 53      LDL Cholesterol  60      Hemoglobin A1C 6.20  6.20     Microalbumin, Urine 1.3        Data reviewed : Consultant notes ortho              Assessment and Plan   Diagnoses and all orders for this visit:    1. Annual physical exam (Primary)  Assessment & Plan:  Discussed age appropriate preventative counseling including all recommended screenings and immunizations, sunscreen, and seatbelt use. Written information provided to patient. All questions answered. Pt verbalized understanding.         2. Screening for colon cancer  -     Cologuard - Stool, Per Rectum; Future    3. Essential hypertension  Assessment & Plan:  Hypertension is unchanged.  Dietary sodium restriction.  Weight loss.  Regular aerobic exercise.  Medication changes per orders.  Ambulatory blood pressure monitoring.  Blood pressure will be reassessed in 4 weeks. Increase  metoprolol to 100mg PO qd and continue lisinopril/hztz as prescribed.       4. Type 2 diabetes mellitus without complication, without long-term current use of insulin  Assessment & Plan:  Diabetes is improving with treatment.   Continue current treatment regimen.  Diabetes will be reassessed in 6 months.      5. Hyperlipidemia, unspecified hyperlipidemia type  Assessment & Plan:  Lipid abnormalities are improving with treatment.  Nutritional counseling was provided. and Pharmacotherapy as ordered.  Lipids will be reassessed in 6 months.      6. Cervical pain  Comments:  likely muscular  flexeril 10mg PO tid prn   diclofenac PRN   Increase rest/ice/heat 20 min TID Prn   Consider imaging if pain worsens or persists    Other orders  -     lisinopril-hydrochlorothiazide (PRINZIDE,ZESTORETIC) 20-25 MG per tablet; Take 1 tablet by mouth Daily.  Dispense: 90 tablet; Refill: 1  -     Discontinue: metoprolol succinate XL (TOPROL-XL) 50 MG 24 hr tablet; Take 1 tablet by mouth Daily.  Dispense: 90 tablet; Refill: 0  -     metoprolol succinate XL (TOPROL-XL) 100 MG 24 hr tablet; Take 1 tablet by mouth Daily.  Dispense: 90 tablet; Refill: 3  -     cyclobenzaprine (FLEXERIL) 10 MG tablet; Take 1 tablet by mouth 3 (Three) Times a Day As Needed for Muscle Spasms.  Dispense: 30 tablet; Refill: 1  -     diclofenac (VOLTAREN) 75 MG EC tablet; Take 1 tablet by mouth 2 (Two) Times a Day.  Dispense: 60 tablet; Refill: 2             Follow Up   Return if symptoms worsen or fail to improve.  Patient was given instructions and counseling regarding her condition or for health maintenance advice. Please see specific information pulled into the AVS if appropriate.

## 2023-09-19 NOTE — ASSESSMENT & PLAN NOTE
Hypertension is unchanged.  Dietary sodium restriction.  Weight loss.  Regular aerobic exercise.  Medication changes per orders.  Ambulatory blood pressure monitoring.  Blood pressure will be reassessed in 4 weeks. Increase metoprolol to 100mg PO qd and continue lisinopril/hztz as prescribed.

## 2023-09-20 RX ORDER — LISINOPRIL AND HYDROCHLOROTHIAZIDE 25; 20 MG/1; MG/1
TABLET ORAL
Qty: 90 TABLET | Refills: 1 | OUTPATIENT
Start: 2023-09-20

## 2023-10-16 RX ORDER — METOPROLOL SUCCINATE 50 MG/1
50 TABLET, EXTENDED RELEASE ORAL DAILY
Qty: 90 TABLET | Refills: 0 | OUTPATIENT
Start: 2023-10-16

## 2023-12-01 ENCOUNTER — HOSPITAL ENCOUNTER (OUTPATIENT)
Dept: MAMMOGRAPHY | Facility: HOSPITAL | Age: 63
Discharge: HOME | End: 2023-12-01
Admitting: NURSE PRACTITIONER
Payer: COMMERCIAL

## 2023-12-01 DIAGNOSIS — Z12.31 ENCOUNTER FOR SCREENING MAMMOGRAM FOR MALIGNANT NEOPLASM OF BREAST: ICD-10-CM

## 2023-12-01 PROCEDURE — 77063 BREAST TOMOSYNTHESIS BI: CPT

## 2023-12-01 PROCEDURE — 77067 SCR MAMMO BI INCL CAD: CPT

## 2024-01-25 ENCOUNTER — LAB (OUTPATIENT)
Dept: LAB | Facility: HOSPITAL | Age: 64
End: 2024-01-25
Payer: COMMERCIAL

## 2024-01-25 DIAGNOSIS — E11.9 TYPE 2 DIABETES MELLITUS WITHOUT COMPLICATION, WITHOUT LONG-TERM CURRENT USE OF INSULIN: ICD-10-CM

## 2024-01-25 DIAGNOSIS — Z11.59 NEED FOR HEPATITIS C SCREENING TEST: ICD-10-CM

## 2024-01-25 LAB
ALBUMIN SERPL-MCNC: 4.7 G/DL (ref 3.5–5.2)
ALBUMIN/GLOB SERPL: 2 G/DL
ALP SERPL-CCNC: 68 U/L (ref 39–117)
ALT SERPL W P-5'-P-CCNC: 15 U/L (ref 1–33)
ANION GAP SERPL CALCULATED.3IONS-SCNC: 12 MMOL/L (ref 5–15)
AST SERPL-CCNC: 18 U/L (ref 1–32)
BASOPHILS # BLD AUTO: 0.03 10*3/MM3 (ref 0–0.2)
BASOPHILS NFR BLD AUTO: 0.5 % (ref 0–1.5)
BILIRUB SERPL-MCNC: 0.4 MG/DL (ref 0–1.2)
BUN SERPL-MCNC: 10 MG/DL (ref 8–23)
BUN/CREAT SERPL: 12 (ref 7–25)
CALCIUM SPEC-SCNC: 10.1 MG/DL (ref 8.6–10.5)
CHLORIDE SERPL-SCNC: 98 MMOL/L (ref 98–107)
CHOLEST SERPL-MCNC: 148 MG/DL (ref 0–200)
CO2 SERPL-SCNC: 30 MMOL/L (ref 22–29)
CREAT SERPL-MCNC: 0.83 MG/DL (ref 0.57–1)
DEPRECATED RDW RBC AUTO: 39 FL (ref 37–54)
EGFRCR SERPLBLD CKD-EPI 2021: 78.8 ML/MIN/1.73
EOSINOPHIL # BLD AUTO: 0.75 10*3/MM3 (ref 0–0.4)
EOSINOPHIL NFR BLD AUTO: 11.5 % (ref 0.3–6.2)
ERYTHROCYTE [DISTWIDTH] IN BLOOD BY AUTOMATED COUNT: 12.7 % (ref 12.3–15.4)
GLOBULIN UR ELPH-MCNC: 2.4 GM/DL
GLUCOSE SERPL-MCNC: 110 MG/DL (ref 65–99)
HBA1C MFR BLD: 6.8 % (ref 4.8–5.6)
HCT VFR BLD AUTO: 38.7 % (ref 34–46.6)
HCV AB SER DONR QL: NORMAL
HDLC SERPL-MCNC: 57 MG/DL (ref 40–60)
HGB BLD-MCNC: 12.7 G/DL (ref 12–15.9)
IMM GRANULOCYTES # BLD AUTO: 0.02 10*3/MM3 (ref 0–0.05)
IMM GRANULOCYTES NFR BLD AUTO: 0.3 % (ref 0–0.5)
LDLC SERPL CALC-MCNC: 78 MG/DL (ref 0–100)
LDLC/HDLC SERPL: 1.36 {RATIO}
LYMPHOCYTES # BLD AUTO: 1.37 10*3/MM3 (ref 0.7–3.1)
LYMPHOCYTES NFR BLD AUTO: 21 % (ref 19.6–45.3)
MCH RBC QN AUTO: 27.9 PG (ref 26.6–33)
MCHC RBC AUTO-ENTMCNC: 32.8 G/DL (ref 31.5–35.7)
MCV RBC AUTO: 84.9 FL (ref 79–97)
MONOCYTES # BLD AUTO: 0.5 10*3/MM3 (ref 0.1–0.9)
MONOCYTES NFR BLD AUTO: 7.7 % (ref 5–12)
NEUTROPHILS NFR BLD AUTO: 3.85 10*3/MM3 (ref 1.7–7)
NEUTROPHILS NFR BLD AUTO: 59 % (ref 42.7–76)
NRBC BLD AUTO-RTO: 0.2 /100 WBC (ref 0–0.2)
PLATELET # BLD AUTO: 355 10*3/MM3 (ref 140–450)
PMV BLD AUTO: 8.9 FL (ref 6–12)
POTASSIUM SERPL-SCNC: 3.7 MMOL/L (ref 3.5–5.2)
PROT SERPL-MCNC: 7.1 G/DL (ref 6–8.5)
RBC # BLD AUTO: 4.56 10*6/MM3 (ref 3.77–5.28)
SODIUM SERPL-SCNC: 140 MMOL/L (ref 136–145)
TRIGL SERPL-MCNC: 66 MG/DL (ref 0–150)
TSH SERPL DL<=0.05 MIU/L-ACNC: 0.29 UIU/ML (ref 0.27–4.2)
VLDLC SERPL-MCNC: 13 MG/DL (ref 5–40)
WBC NRBC COR # BLD AUTO: 6.52 10*3/MM3 (ref 3.4–10.8)

## 2024-01-25 PROCEDURE — 80061 LIPID PANEL: CPT

## 2024-01-25 PROCEDURE — 36415 COLL VENOUS BLD VENIPUNCTURE: CPT

## 2024-01-25 PROCEDURE — 86803 HEPATITIS C AB TEST: CPT

## 2024-01-25 PROCEDURE — 80050 GENERAL HEALTH PANEL: CPT

## 2024-01-25 PROCEDURE — 83036 HEMOGLOBIN GLYCOSYLATED A1C: CPT

## 2024-01-30 ENCOUNTER — OFFICE VISIT (OUTPATIENT)
Dept: ORTHOPEDIC SURGERY | Facility: CLINIC | Age: 64
End: 2024-01-30
Payer: COMMERCIAL

## 2024-01-30 VITALS — SYSTOLIC BLOOD PRESSURE: 171 MMHG | OXYGEN SATURATION: 96 % | HEART RATE: 77 BPM | DIASTOLIC BLOOD PRESSURE: 83 MMHG

## 2024-01-30 DIAGNOSIS — Z96.652 HISTORY OF TOTAL KNEE ARTHROPLASTY, LEFT: ICD-10-CM

## 2024-01-30 DIAGNOSIS — Z47.1 AFTERCARE FOLLOWING LEFT KNEE JOINT REPLACEMENT SURGERY: Primary | ICD-10-CM

## 2024-01-30 DIAGNOSIS — Z96.652 AFTERCARE FOLLOWING LEFT KNEE JOINT REPLACEMENT SURGERY: Primary | ICD-10-CM

## 2024-01-30 PROCEDURE — 99213 OFFICE O/P EST LOW 20 MIN: CPT | Performed by: PHYSICIAN ASSISTANT

## 2024-01-30 NOTE — PROGRESS NOTES
"Chief Complaint  Follow-up of the Left Knee    Subjective      Mahsa Spain presents to [facility] for follow-up of left knee.  She has a history of left total knee arthroplasty performed on 1/27/2023 by Dr. Card.  She presents independently ambulatory without use of assistive device.  She reports that her knee is \"doing good\".  She has no complaints related to her left knee and has been able to return to baseline activities and ADLs.  She reports she works at a [employer] and is fairly active, as well as exercises regularly.  She has no complaints of pain, swelling, or stiffness.    Objective   No Known Allergies    Vital Signs:   /83   Pulse 77   SpO2 96%       Physical Exam    Constitutional: Awake, alert. Well nourished appearance.    Integumentary: Warm, dry, intact. No obvious rashes.    HENT: Atraumatic, normocephalic.   Respiratory: Non labored respirations .   Cardiovascular: Intact peripheral pulses.    Psychiatric: Normal mood and affect. A&O X3    Ortho Exam  Left knee: Skin is warm, dry, and intact.  Well-healed surgical scar noted.  Full knee extension and flexion to 130 degrees.  Full plantarflexion and dorsiflexion of the ankle.  Sensation and distal neurovascular intact.  Smooth sit to stand transition.  Patient fully weightbearing with nonantalgic gait.    Imaging Results (Most Recent)       Procedure Component Value Units Date/Time    XR Knee 3 View Left [[id number]] Resulted: 01/30/24 0811     Updated: 01/30/24 0811    Narrative:      X-Ray Report:  Study: X-rays ordered, taken in the office, and reviewed today.   Site: Left knee Xray  Indication: TKA  View: AP/Lateral, Standing, and [place] view(s)  Findings: Intact left total knee arthroplasty. No evidence of hardware   malfunction.   Prior studies available for comparison: yes                       Assessment and Plan   Problem List Items Addressed This Visit    None  Visit Diagnoses       Aftercare " following left knee joint replacement surgery    -  Primary    Relevant Orders    XR Knee 3 View Left (Completed)    History of total knee arthroplasty, left                Follow Up   Return in about 1 year (around 1/30/2025).    Tobacco Use: Low Risk  (1/30/2024)    Patient History     Smoking Tobacco Use: Never     Smokeless Tobacco Use: Never     Passive Exposure: Not on file     Patient is a non-smoker.  Did not discuss tobacco cessation options.    Patient Instructions   X-rays reviewed, showing hardware intact. Continue home exercise program to progress strength and ROM.    Continue with lifelong antibiotic prophylaxis with dental procedures following total joint replacement.    Follow-up in 1 year. Call sooner or return to care, if needed with any changes or concerns. Repeat x-rays.     Patient was given instructions and counseling regarding her condition or for health maintenance advice. Please see specific information pulled into the AVS if appropriate.

## 2024-02-01 ENCOUNTER — OFFICE VISIT (OUTPATIENT)
Dept: FAMILY MEDICINE CLINIC | Facility: CLINIC | Age: 64
End: 2024-02-01
Payer: COMMERCIAL

## 2024-02-01 VITALS
HEIGHT: 63 IN | SYSTOLIC BLOOD PRESSURE: 144 MMHG | HEART RATE: 73 BPM | RESPIRATION RATE: 16 BRPM | BODY MASS INDEX: 29.45 KG/M2 | OXYGEN SATURATION: 99 % | WEIGHT: 166.2 LBS | TEMPERATURE: 98.2 F | DIASTOLIC BLOOD PRESSURE: 61 MMHG

## 2024-02-01 DIAGNOSIS — E11.9 TYPE 2 DIABETES MELLITUS WITHOUT COMPLICATION, WITHOUT LONG-TERM CURRENT USE OF INSULIN: Primary | ICD-10-CM

## 2024-02-01 DIAGNOSIS — E78.5 HYPERLIPIDEMIA, UNSPECIFIED HYPERLIPIDEMIA TYPE: ICD-10-CM

## 2024-02-01 DIAGNOSIS — I10 ESSENTIAL HYPERTENSION: ICD-10-CM

## 2024-02-01 DIAGNOSIS — E66.3 OVERWEIGHT (BMI 25.0-29.9): ICD-10-CM

## 2024-02-01 RX ORDER — METOPROLOL SUCCINATE 100 MG/1
100 TABLET, EXTENDED RELEASE ORAL DAILY
Qty: 90 TABLET | Refills: 3 | Status: SHIPPED | OUTPATIENT
Start: 2024-02-01

## 2024-02-01 RX ORDER — LISINOPRIL AND HYDROCHLOROTHIAZIDE 25; 20 MG/1; MG/1
1 TABLET ORAL DAILY
Qty: 90 TABLET | Refills: 1 | Status: SHIPPED | OUTPATIENT
Start: 2024-02-01

## 2024-02-01 RX ORDER — METFORMIN HYDROCHLORIDE 500 MG/1
500 TABLET, EXTENDED RELEASE ORAL
Qty: 90 TABLET | Refills: 3 | Status: SHIPPED | OUTPATIENT
Start: 2024-02-01

## 2024-02-01 RX ORDER — ATORVASTATIN CALCIUM 10 MG/1
10 TABLET, FILM COATED ORAL DAILY
Qty: 90 TABLET | Refills: 3 | Status: SHIPPED | OUTPATIENT
Start: 2024-02-01

## 2024-02-01 NOTE — ASSESSMENT & PLAN NOTE
Family... Patient's (Body mass index is 29.44 kg/m².) indicates that they are overweight with health conditions that include hypertension, diabetes mellitus and dyslipidemias . Weight is worsening. BMI is is above average; BMI management plan is completed. We discussed portion control and increasing exercise. Pt is working on watching her diet.

## 2024-02-01 NOTE — PROGRESS NOTES
"Chief Complaint  Med Refill and Hypertension (Running 171/83 at Dr Card office (ortho) on 1/30/2024.)    Subjective        Mahsa Spain presents to [facility]  History of Present Illness  Pt presents c/o increased BP while at ortho office 1/30/24. States she is  taking metoprolol and lisinpril/hztz as prescribed. Denies HA, blurred vision, dizziness, SOB, chest pain or other. No other recent medication changes. Pt did have recent labs which were unremarkable.     Reviewed all recent labs and medications.      Objective   Vital Signs:  /61   Pulse 73   Temp 98.2 °F (36.8 °C) (Temporal)   Resp 16   Ht 160 cm (63\")   Wt 75.4 kg (166 lb 3.2 oz)   SpO2 99%   BMI 29.44 kg/m²   Estimated body mass index is 29.44 kg/m² as calculated from the following:    Height as of this encounter: 160 cm (63\").    Weight as of this encounter: 75.4 kg (166 lb 3.2 oz).               Physical Exam  Vitals reviewed.   Constitutional:       General: She is not in acute distress.     Appearance: Normal appearance.   HENT:      Head: Normocephalic.      Right Ear: Tympanic membrane normal.      Left Ear: Tympanic membrane normal.      Nose: Nose normal.      Mouth/Throat:      Pharynx: Oropharynx is clear. No posterior oropharyngeal erythema.   Eyes:      General: No scleral icterus.     Extraocular Movements: Extraocular movements intact.      Conjunctiva/sclera: Conjunctivae normal.      Pupils: Pupils are equal, round, and reactive to light.   Cardiovascular:      Rate and Rhythm: Normal rate and regular rhythm.      Pulses: Normal pulses.      Heart sounds: Normal heart sounds.   Pulmonary:      Effort: Pulmonary effort is normal.      Breath sounds: Normal breath sounds.   Abdominal:      General: Bowel sounds are normal.      Palpations: Abdomen is soft.   Musculoskeletal:         General: Normal range of motion.      Cervical back: Neck supple.   Skin:     General: Skin is warm and dry. "   Neurological:      Mental Status: She is alert and oriented to person, place, and time.   Psychiatric:         Mood and Affect: Mood normal.         Behavior: Behavior normal.         Thought Content: Thought content normal.         Judgment: Judgment normal.        Result Review :      Common labs          1/25/2024    08:12   Common Labs   Glucose 110    BUN 10    Creatinine 0.83    Sodium 140    Potassium 3.7    Chloride 98    Calcium 10.1    Albumin 4.7    Total Bilirubin 0.4    Alkaline Phosphatase 68    AST (SGOT) 18    ALT (SGPT) 15    WBC 6.52    Hemoglobin 12.7    Hematocrit 38.7    Platelets 355    Total Cholesterol 148    Triglycerides 66    HDL Cholesterol 57    LDL Cholesterol  78    Hemoglobin A1C 6.80      Data reviewed : Consultant notes ortho              Assessment and Plan     Diagnoses and all orders for this visit:    1. Type 2 diabetes mellitus without complication, without long-term current use of insulin (Primary)  Assessment & Plan:  Diabetes is improving with treatment.   Continue current treatment regimen.  Diabetes will be reassessed in 6 months.    Orders:  -     Microalbumin / Creatinine Urine Ratio - Urine, Clean Catch; Future    2. Overweight (BMI 25.0-29.9)  Assessment & Plan:  Patient's (Body mass index is 29.44 kg/m².) indicates that they are overweight with health conditions that include hypertension, diabetes mellitus and dyslipidemias . Weight is worsening. BMI is is above average; BMI management plan is completed. We discussed portion control and increasing exercise. Pt is working on watching her diet.       3. Hyperlipidemia, unspecified hyperlipidemia type  Assessment & Plan:  Lipid abnormalities are improving with treatment.  Continue with lifestyle mods.  Lipids will be reassessed in 6 months.      4. Essential hypertension  Assessment & Plan:  Hypertension is  recently elevated .  Continue current treatment regimen.  Dietary sodium restriction.  Weight loss.  Regular  aerobic exercise.  Ambulatory blood pressure monitoring.  Blood pressure will be reassessed at the next regular appointment. Discussed tx options including increasing lisinopril. Pt wishes to CTM at this time Will notify if elevation persists.       Other orders  -     lisinopril-hydrochlorothiazide (PRINZIDE,ZESTORETIC) 20-25 MG per tablet; Take 1 tablet by mouth Daily.  Dispense: 90 tablet; Refill: 1  -     metoprolol succinate XL (TOPROL-XL) 100 MG 24 hr tablet; Take 1 tablet by mouth Daily.  Dispense: 90 tablet; Refill: 3  -     metFORMIN ER (GLUCOPHAGE-XR) 500 MG 24 hr tablet; Take 1 tablet by mouth Daily With Breakfast.  Dispense: 90 tablet; Refill: 3  -     atorvastatin (LIPITOR) 10 MG tablet; Take 1 tablet by mouth Daily.  Dispense: 90 tablet; Refill: 3             Follow Up     Return in about 6 months (around 8/1/2024), or if symptoms worsen or fail to improve.  Patient was given instructions and counseling regarding her condition or for health maintenance advice. Please see specific information pulled into the AVS if appropriate.         Answers submitted by the patient for this visit:  Other (Submitted on 1/28/2024)  Please describe your symptoms.: Follow-up for blood work and prescriptions refill  Have you had these symptoms before?: No  How long have you been having these symptoms?: Greater than 2 weeks  Primary Reason for Visit (Submitted on 1/28/2024)  What is the primary reason for your visit?: Other

## 2024-02-01 NOTE — ASSESSMENT & PLAN NOTE
Hypertension is  recently elevated .  Continue current treatment regimen.  Dietary sodium restriction.  Weight loss.  Regular aerobic exercise.  Ambulatory blood pressure monitoring.  Blood pressure will be reassessed at the next regular appointment. Discussed tx options including increasing lisinopril. Pt wishes to CTM at this time Will notify if elevation persists.

## 2024-02-01 NOTE — ASSESSMENT & PLAN NOTE
Lipid abnormalities are improving with treatment.  Continue with lifestyle mods.  Lipids will be reassessed in 6 months.

## 2024-04-04 RX ORDER — LANCETS 30 GAUGE
1 EACH MISCELLANEOUS 2 TIMES DAILY
Qty: 100 EACH | Refills: 11 | Status: SHIPPED | OUTPATIENT
Start: 2024-04-04

## 2024-05-17 ENCOUNTER — OFFICE VISIT (OUTPATIENT)
Dept: FAMILY MEDICINE CLINIC | Facility: CLINIC | Age: 64
End: 2024-05-17
Payer: COMMERCIAL

## 2024-05-17 VITALS
BODY MASS INDEX: 29.77 KG/M2 | DIASTOLIC BLOOD PRESSURE: 66 MMHG | WEIGHT: 168 LBS | TEMPERATURE: 97 F | OXYGEN SATURATION: 99 % | HEART RATE: 70 BPM | SYSTOLIC BLOOD PRESSURE: 145 MMHG | RESPIRATION RATE: 18 BRPM | HEIGHT: 63 IN
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
## 2024-05-17 DIAGNOSIS — I10 ESSENTIAL HYPERTENSION: Primary | ICD-10-CM

## 2024-05-17 DIAGNOSIS — E66.3 OVERWEIGHT (BMI 25.0-29.9): ICD-10-CM

## 2024-05-17 DIAGNOSIS — S90.862A INSECT BITE OF LEFT FOOT, INITIAL ENCOUNTER: ICD-10-CM

## 2024-05-17 DIAGNOSIS — E11.9 TYPE 2 DIABETES MELLITUS WITHOUT COMPLICATION, WITHOUT LONG-TERM CURRENT USE OF INSULIN: ICD-10-CM

## 2024-05-17 PROCEDURE — 99214 OFFICE O/P EST MOD 30 MIN: CPT | Performed by: NURSE PRACTITIONER

## 2024-05-17 RX ORDER — SULFAMETHOXAZOLE AND TRIMETHOPRIM 800; 160 MG/1; MG/1
1 TABLET ORAL 2 TIMES DAILY
Qty: 14 TABLET | Refills: 0 | Status: SHIPPED | OUTPATIENT
Start: 2024-05-17 | End: 2024-05-17

## 2024-05-17 RX ORDER — SULFAMETHOXAZOLE AND TRIMETHOPRIM 800; 160 MG/1; MG/1
1 TABLET ORAL 2 TIMES DAILY
Qty: 20 TABLET | Refills: 0 | Status: SHIPPED | OUTPATIENT
Start: 2024-05-17 | End: 2024-05-27

## 2024-05-17 NOTE — ASSESSMENT & PLAN NOTE
Hypertension is borderline  Continue current treatment regimen.  Dietary sodium restriction.  Weight loss.  Regular aerobic exercise.  Ambulatory blood pressure monitoring.  Blood pressure will be reassessedat the next regular appointment.

## 2024-05-17 NOTE — ASSESSMENT & PLAN NOTE
Diabetes is improving with treatment.  Well controlled  Continue current treatment regimen.  Diabetes will be reassessed next month during Annual PE

## 2024-05-17 NOTE — PROGRESS NOTES
"Chief Complaint  Insect Bite (Bottom of left foot, itchy, getting sore.)    Subjective        Mahsa Spain presents to [facility]  History of Present Illness  Pt presents c/o insect bite on bottom of foot x 1 week. States bottoms of foot was extremely itchy then lesion appeared. Does not recall getting bit or stung recently. Has not taken anything to treat. States area is tender when walking on it. Denies fever, chills, myalgia, SOB, chest pain, n/v/d or other.     Reviewed all recent labs and medications.  Insect Bite    Hypertension        Objective   Vital Signs:  /66   Pulse 70   Temp 97 °F (36.1 °C) (Temporal)   Resp 18   Ht 160 cm (62.99\")   Wt 76.2 kg (168 lb)   SpO2 99%   BMI 29.77 kg/m²   Estimated body mass index is 29.77 kg/m² as calculated from the following:    Height as of this encounter: 160 cm (62.99\").    Weight as of this encounter: 76.2 kg (168 lb).               Physical Exam  Vitals reviewed.   Constitutional:       General: She is not in acute distress.     Appearance: Normal appearance.   HENT:      Head: Normocephalic.      Right Ear: Tympanic membrane normal.      Left Ear: Tympanic membrane normal.      Nose: Nose normal.      Mouth/Throat:      Pharynx: Oropharynx is clear. No posterior oropharyngeal erythema.   Eyes:      General: No scleral icterus.     Extraocular Movements: Extraocular movements intact.      Conjunctiva/sclera: Conjunctivae normal.      Pupils: Pupils are equal, round, and reactive to light.   Cardiovascular:      Rate and Rhythm: Normal rate and regular rhythm.      Pulses: Normal pulses.      Heart sounds: Normal heart sounds.   Pulmonary:      Effort: Pulmonary effort is normal.      Breath sounds: Normal breath sounds.   Abdominal:      General: Bowel sounds are normal.      Palpations: Abdomen is soft.   Musculoskeletal:         General: Normal range of motion.      Cervical back: Neck supple.   Feet:      Left " foot:      Skin integrity: Blister present.      Comments: L foot plantar surface midfood 1cm round pus-filled blister-like lesion, no erythema, no edema or streaking, mildly tender to palpation  Skin:     General: Skin is warm and dry.   Neurological:      Mental Status: She is alert and oriented to person, place, and time.   Psychiatric:         Mood and Affect: Mood normal.         Behavior: Behavior normal.         Thought Content: Thought content normal.         Judgment: Judgment normal.        Result Review :      Common labs          1/25/2024    08:12   Common Labs   Glucose 110    BUN 10    Creatinine 0.83    Sodium 140    Potassium 3.7    Chloride 98    Calcium 10.1    Albumin 4.7    Total Bilirubin 0.4    Alkaline Phosphatase 68    AST (SGOT) 18    ALT (SGPT) 15    WBC 6.52    Hemoglobin 12.7    Hematocrit 38.7    Platelets 355    Total Cholesterol 148    Triglycerides 66    HDL Cholesterol 57    LDL Cholesterol  78    Hemoglobin A1C 6.80                   Assessment and Plan     Diagnoses and all orders for this visit:    1. Essential hypertension (Primary)  Assessment & Plan:  Hypertension is borderline  Continue current treatment regimen.  Dietary sodium restriction.  Weight loss.  Regular aerobic exercise.  Ambulatory blood pressure monitoring.  Blood pressure will be reassessedat the next regular appointment.      2. Overweight (BMI 25.0-29.9)  Assessment & Plan:  Patient's (Body mass index is 29.77 kg/m².) indicates that they are overweight with health conditions that include hypertension, diabetes mellitus and dyslipidemias . Weight is stable. BMI is is above average; BMI management plan is completed. We discussed portion control and increasing exercise. Pt is working on watching her diet.       3. Type 2 diabetes mellitus without complication, without long-term current use of insulin  Assessment & Plan:  Diabetes is improving with treatment.  Well controlled  Continue current treatment  regimen.  Diabetes will be reassessed next month during Annual PE      4. Insect bite of left foot, initial encounter  Assessment & Plan:  Bactrim DS 1 tab PO bid x 10 days   Mupirocin apply tid prn   Keep clean and dry   Notify office/proceed to ED/UC if s/s worsen despite tx or if fever, chills, n/v or other occur.   All questions answered, pt verbalizes understanding, and agrees to POC  Oral antihistamine qd         Other orders  -     Discontinue: sulfamethoxazole-trimethoprim (Bactrim DS) 800-160 MG per tablet; Take 1 tablet by mouth 2 (Two) Times a Day for 7 days.  Dispense: 14 tablet; Refill: 0  -     mupirocin (BACTROBAN) 2 % ointment; Apply 1 Application topically to the appropriate area as directed 3 (Three) Times a Day.  Dispense: 1 each; Refill: 0  -     sulfamethoxazole-trimethoprim (Bactrim DS) 800-160 MG per tablet; Take 1 tablet by mouth 2 (Two) Times a Day for 10 days.  Dispense: 20 tablet; Refill: 0             Follow Up     Return if symptoms worsen or fail to improve.  Patient was given instructions and counseling regarding her condition or for health maintenance advice. Please see specific information pulled into the AVS if appropriate.

## 2024-05-17 NOTE — ASSESSMENT & PLAN NOTE
Bactrim DS 1 tab PO bid x 10 days   Mupirocin apply tid prn   Keep clean and dry   Notify office/proceed to ED/UC if s/s worsen despite tx or if fever, chills, n/v or other occur.   All questions answered, pt verbalizes understanding, and agrees to POC  Oral antihistamine qd

## 2024-05-17 NOTE — ASSESSMENT & PLAN NOTE
Patient's (Body mass index is 29.77 kg/m².) indicates that they are overweight with health conditions that include hypertension, diabetes mellitus and dyslipidemias . Weight is stable. BMI is is above average; BMI management plan is completed. We discussed portion control and increasing exercise. Pt is working on watching her diet.

## 2024-06-27 RX ORDER — LISINOPRIL AND HYDROCHLOROTHIAZIDE 25; 20 MG/1; MG/1
1 TABLET ORAL DAILY
Qty: 30 TABLET | Refills: 5 | Status: SHIPPED | OUTPATIENT
Start: 2024-06-27

## 2024-08-02 ENCOUNTER — TELEPHONE (OUTPATIENT)
Dept: FAMILY MEDICINE CLINIC | Facility: CLINIC | Age: 64
End: 2024-08-02
Payer: COMMERCIAL

## 2024-08-02 DIAGNOSIS — Z12.31 ENCOUNTER FOR SCREENING MAMMOGRAM FOR MALIGNANT NEOPLASM OF BREAST: Primary | ICD-10-CM

## 2024-08-02 NOTE — TELEPHONE ENCOUNTER
Caller: Mahsa Spain    Relationship to patient: Self    Best call back number: [phone]    Patient is needing: PATIENT CALLED IN AND IS REQUESTING A MAMMOGRAM ORDER FOR 12/2/2024 OR AFTER SINCE HER LAST MAMMOGRAM WAS 12/1/2023. PATIENT IS WANTING TO HAVE THAT PERFORMED AT [facility]. PATIENT SAID IT IS OKAY TO LEAVE MESSAGE ON PHONE.

## 2024-08-13 ENCOUNTER — TELEPHONE (OUTPATIENT)
Dept: FAMILY MEDICINE CLINIC | Facility: CLINIC | Age: 64
End: 2024-08-13
Payer: COMMERCIAL

## 2024-08-13 ENCOUNTER — TELEPHONE (OUTPATIENT)
Dept: FAMILY MEDICINE CLINIC | Facility: CLINIC | Age: 64
End: 2024-08-13

## 2024-08-13 DIAGNOSIS — Z00.00 ANNUAL PHYSICAL EXAM: Primary | ICD-10-CM

## 2024-08-13 NOTE — TELEPHONE ENCOUNTER
Caller: Mahsa Spain    Relationship: Self    Best call back number: [phone]     What orders are you requesting (i.e. lab or imaging): FOLLOW UP LABS FOR CHOLESTEROL AND DIABETES     In what timeframe would the patient need to come in: ASAP    Where will you receive your lab/imaging services: [facility]    Additional notes: PATIENT HAD ORDERED A MAMMOGRAM BUT WAS NEVER CONTACTED FOR SCHEDULING AND SHE WOULD LIKE AN UPDATE

## 2024-08-27 ENCOUNTER — LAB (OUTPATIENT)
Dept: LAB | Facility: HOSPITAL | Age: 64
End: 2024-08-27
Payer: COMMERCIAL

## 2024-08-27 DIAGNOSIS — Z00.00 ANNUAL PHYSICAL EXAM: ICD-10-CM

## 2024-08-27 LAB
ALBUMIN SERPL-MCNC: 4.4 G/DL (ref 3.5–5.2)
ALBUMIN/GLOB SERPL: 1.5 G/DL
ALP SERPL-CCNC: 66 U/L (ref 39–117)
ALT SERPL W P-5'-P-CCNC: 16 U/L (ref 1–33)
ANION GAP SERPL CALCULATED.3IONS-SCNC: 10.6 MMOL/L (ref 5–15)
AST SERPL-CCNC: 23 U/L (ref 1–32)
BASOPHILS # BLD AUTO: 0.02 10*3/MM3 (ref 0–0.2)
BASOPHILS NFR BLD AUTO: 0.3 % (ref 0–1.5)
BILIRUB SERPL-MCNC: 0.5 MG/DL (ref 0–1.2)
BUN SERPL-MCNC: 9 MG/DL (ref 8–23)
BUN/CREAT SERPL: 11.1 (ref 7–25)
CALCIUM SPEC-SCNC: 10.2 MG/DL (ref 8.6–10.5)
CHLORIDE SERPL-SCNC: 99 MMOL/L (ref 98–107)
CHOLEST SERPL-MCNC: 151 MG/DL (ref 0–200)
CO2 SERPL-SCNC: 28.4 MMOL/L (ref 22–29)
CREAT SERPL-MCNC: 0.81 MG/DL (ref 0.57–1)
DEPRECATED RDW RBC AUTO: 39 FL (ref 37–54)
EGFRCR SERPLBLD CKD-EPI 2021: 81.2 ML/MIN/1.73
EOSINOPHIL # BLD AUTO: 0.17 10*3/MM3 (ref 0–0.4)
EOSINOPHIL NFR BLD AUTO: 2.8 % (ref 0.3–6.2)
ERYTHROCYTE [DISTWIDTH] IN BLOOD BY AUTOMATED COUNT: 12.9 % (ref 12.3–15.4)
GLOBULIN UR ELPH-MCNC: 3 GM/DL
GLUCOSE SERPL-MCNC: 115 MG/DL (ref 65–99)
HBA1C MFR BLD: 6.1 % (ref 4.8–5.6)
HCT VFR BLD AUTO: 39.6 % (ref 34–46.6)
HDLC SERPL-MCNC: 49 MG/DL (ref 40–60)
HGB BLD-MCNC: 13.6 G/DL (ref 12–15.9)
IMM GRANULOCYTES # BLD AUTO: 0.02 10*3/MM3 (ref 0–0.05)
IMM GRANULOCYTES NFR BLD AUTO: 0.3 % (ref 0–0.5)
LDLC SERPL CALC-MCNC: 83 MG/DL (ref 0–100)
LDLC/HDLC SERPL: 1.66 {RATIO}
LYMPHOCYTES # BLD AUTO: 1.07 10*3/MM3 (ref 0.7–3.1)
LYMPHOCYTES NFR BLD AUTO: 17.4 % (ref 19.6–45.3)
MCH RBC QN AUTO: 29.4 PG (ref 26.6–33)
MCHC RBC AUTO-ENTMCNC: 34.3 G/DL (ref 31.5–35.7)
MCV RBC AUTO: 85.7 FL (ref 79–97)
MONOCYTES # BLD AUTO: 0.57 10*3/MM3 (ref 0.1–0.9)
MONOCYTES NFR BLD AUTO: 9.3 % (ref 5–12)
NEUTROPHILS NFR BLD AUTO: 4.29 10*3/MM3 (ref 1.7–7)
NEUTROPHILS NFR BLD AUTO: 69.9 % (ref 42.7–76)
NRBC BLD AUTO-RTO: 0 /100 WBC (ref 0–0.2)
PLATELET # BLD AUTO: 314 10*3/MM3 (ref 140–450)
PMV BLD AUTO: 8.9 FL (ref 6–12)
POTASSIUM SERPL-SCNC: 3.6 MMOL/L (ref 3.5–5.2)
PROT SERPL-MCNC: 7.4 G/DL (ref 6–8.5)
RBC # BLD AUTO: 4.62 10*6/MM3 (ref 3.77–5.28)
SODIUM SERPL-SCNC: 138 MMOL/L (ref 136–145)
T4 FREE SERPL-MCNC: 1.19 NG/DL (ref 0.92–1.68)
TRIGL SERPL-MCNC: 103 MG/DL (ref 0–150)
TSH SERPL DL<=0.05 MIU/L-ACNC: 0.37 UIU/ML (ref 0.27–4.2)
VLDLC SERPL-MCNC: 19 MG/DL (ref 5–40)
WBC NRBC COR # BLD AUTO: 6.14 10*3/MM3 (ref 3.4–10.8)

## 2024-08-27 PROCEDURE — 83036 HEMOGLOBIN GLYCOSYLATED A1C: CPT

## 2024-08-27 PROCEDURE — 36415 COLL VENOUS BLD VENIPUNCTURE: CPT

## 2024-08-27 PROCEDURE — 84439 ASSAY OF FREE THYROXINE: CPT

## 2024-08-27 PROCEDURE — 80061 LIPID PANEL: CPT

## 2024-08-27 PROCEDURE — 80050 GENERAL HEALTH PANEL: CPT

## 2024-09-25 ENCOUNTER — TELEPHONE (OUTPATIENT)
Dept: FAMILY MEDICINE CLINIC | Facility: CLINIC | Age: 64
End: 2024-09-25

## 2024-09-25 ENCOUNTER — OFFICE VISIT (OUTPATIENT)
Dept: FAMILY MEDICINE CLINIC | Facility: CLINIC | Age: 64
End: 2024-09-25
Payer: COMMERCIAL

## 2024-09-25 VITALS
HEART RATE: 78 BPM | SYSTOLIC BLOOD PRESSURE: 135 MMHG | DIASTOLIC BLOOD PRESSURE: 73 MMHG | OXYGEN SATURATION: 97 % | HEIGHT: 63 IN | BODY MASS INDEX: 29.59 KG/M2 | WEIGHT: 167 LBS | TEMPERATURE: 98.2 F

## 2024-09-25 DIAGNOSIS — E78.5 HYPERLIPIDEMIA, UNSPECIFIED HYPERLIPIDEMIA TYPE: ICD-10-CM

## 2024-09-25 DIAGNOSIS — J32.9 SINUSITIS, UNSPECIFIED CHRONICITY, UNSPECIFIED LOCATION: ICD-10-CM

## 2024-09-25 DIAGNOSIS — I10 ESSENTIAL HYPERTENSION: Primary | ICD-10-CM

## 2024-09-25 DIAGNOSIS — E11.9 TYPE 2 DIABETES MELLITUS WITHOUT COMPLICATION, WITHOUT LONG-TERM CURRENT USE OF INSULIN: ICD-10-CM

## 2024-09-25 DIAGNOSIS — U07.1 COVID-19: ICD-10-CM

## 2024-09-25 DIAGNOSIS — E66.3 OVERWEIGHT (BMI 25.0-29.9): ICD-10-CM

## 2024-09-25 DIAGNOSIS — Z00.00 ANNUAL PHYSICAL EXAM: ICD-10-CM

## 2024-09-25 PROBLEM — S90.862A INSECT BITE OF LEFT FOOT: Status: RESOLVED | Noted: 2024-05-17 | Resolved: 2024-09-25
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
## 2024-09-25 LAB
ALBUMIN UR-MCNC: <1.2 MG/DL
CREAT UR-MCNC: 55.5 MG/DL
MICROALBUMIN/CREAT UR: NORMAL MG/G{CREAT}

## 2024-09-25 PROCEDURE — 82043 UR ALBUMIN QUANTITATIVE: CPT | Performed by: NURSE PRACTITIONER

## 2024-09-25 PROCEDURE — 99214 OFFICE O/P EST MOD 30 MIN: CPT | Performed by: NURSE PRACTITIONER

## 2024-09-25 PROCEDURE — 96372 THER/PROPH/DIAG INJ SC/IM: CPT | Performed by: NURSE PRACTITIONER

## 2024-09-25 PROCEDURE — 82570 ASSAY OF URINE CREATININE: CPT | Performed by: NURSE PRACTITIONER

## 2024-09-25 PROCEDURE — 99396 PREV VISIT EST AGE 40-64: CPT | Performed by: NURSE PRACTITIONER

## 2024-09-25 RX ORDER — AZITHROMYCIN 250 MG/1
TABLET, FILM COATED ORAL
Qty: 6 TABLET | Refills: 0 | Status: SHIPPED | OUTPATIENT
Start: 2024-09-25

## 2024-09-25 RX ORDER — TRIAMCINOLONE ACETONIDE 40 MG/ML
60 INJECTION, SUSPENSION INTRA-ARTICULAR; INTRAMUSCULAR ONCE
Status: COMPLETED | OUTPATIENT
Start: 2024-09-25 | End: 2024-09-25

## 2024-09-25 RX ORDER — METOPROLOL SUCCINATE 100 MG/1
100 TABLET, EXTENDED RELEASE ORAL DAILY
Qty: 90 TABLET | Refills: 3 | Status: SHIPPED | OUTPATIENT
Start: 2024-09-25

## 2024-09-25 RX ADMIN — TRIAMCINOLONE ACETONIDE 60 MG: 40 INJECTION, SUSPENSION INTRA-ARTICULAR; INTRAMUSCULAR at 15:02

## 2024-09-25 NOTE — TELEPHONE ENCOUNTER
Caller: Mahsa Spain    Relationship: Self    Best call back number: [phone]    What is the best time to reach you: ANYTIME    Who are you requesting to speak with (clinical staff, provider,  specific staff member): NURSE     What was the call regarding: PATIENT IS CALLING REGARDING A NEW MEDICATION, SHE HAS SOME QUESTIONS ABOUT IT. PLEASE GIVE HER A CALL WHEN YOU HAVE A MOMENT

## 2024-09-25 NOTE — TELEPHONE ENCOUNTER
Caller: Mahsa Spain    Relationship: Self    Best call back number: [phone]    What was the call regarding: PATIENT SPOKE WITH PHARMACY AND PICKED UP THE RIGHT MEDICATION. IF SHE HAS QUESTIONS ABOUT THE NEW SCRIPT, SHE WILL CALL BACK.

## 2024-12-03 ENCOUNTER — HOSPITAL ENCOUNTER (OUTPATIENT)
Dept: MAMMOGRAPHY | Facility: HOSPITAL | Age: 64
Discharge: HOME | End: 2024-12-03
Admitting: NURSE PRACTITIONER
Payer: MEDICARE

## 2024-12-03 DIAGNOSIS — Z12.31 ENCOUNTER FOR SCREENING MAMMOGRAM FOR MALIGNANT NEOPLASM OF BREAST: ICD-10-CM

## 2024-12-03 PROCEDURE — 77063 BREAST TOMOSYNTHESIS BI: CPT

## 2024-12-03 PROCEDURE — 77067 SCR MAMMO BI INCL CAD: CPT

## 2025-01-30 ENCOUNTER — OFFICE VISIT (OUTPATIENT)
Dept: ORTHOPEDIC SURGERY | Facility: CLINIC | Age: 65
End: 2025-01-30
Payer: MEDICARE

## 2025-01-30 VITALS
OXYGEN SATURATION: 98 % | BODY MASS INDEX: 29.59 KG/M2 | SYSTOLIC BLOOD PRESSURE: 149 MMHG | WEIGHT: 167 LBS | HEART RATE: 69 BPM | HEIGHT: 63 IN | DIASTOLIC BLOOD PRESSURE: 87 MMHG

## 2025-01-30 DIAGNOSIS — Z96.652 HISTORY OF TOTAL KNEE ARTHROPLASTY, LEFT: ICD-10-CM

## 2025-01-30 DIAGNOSIS — Z96.652 AFTERCARE FOLLOWING LEFT KNEE JOINT REPLACEMENT SURGERY: Primary | ICD-10-CM

## 2025-01-30 DIAGNOSIS — Z47.1 AFTERCARE FOLLOWING LEFT KNEE JOINT REPLACEMENT SURGERY: Primary | ICD-10-CM

## 2025-01-30 PROCEDURE — 3077F SYST BP >= 140 MM HG: CPT

## 2025-01-30 PROCEDURE — 99213 OFFICE O/P EST LOW 20 MIN: CPT

## 2025-01-30 PROCEDURE — 3079F DIAST BP 80-89 MM HG: CPT

## 2025-01-30 NOTE — PROGRESS NOTES
"Chief Complaint  Follow-up of the Left Knee    Subjective      Mahsa Spain presents to [facility] for follow up of her left knee .  They are 2 year(s) postop left total knee arthroplasty performed by Dr. Card on 1/27/2023.  She arrives today independently ambulatory without the use of any assistive devices.  She reports he is doing very well.  She has no complaints and denies any falls or injuries.    No Known Allergies    Objective     Vital Signs:   Vitals:    01/30/25 0756   BP: 149/87   Pulse: 69   SpO2: 98%   Weight: 75.8 kg (167 lb)   Height: 160 cm (62.99\")     Body mass index is 29.59 kg/m².    I reviewed the patient's chief complaint, history of present illness, review of systems, past medical history, surgical history, family history, social history, medications, and allergy list.     Ortho Exam  Knee   General: Alert, no acute distress.   Left Knee: No pain with passive hip ROM. Incision well healed. Knee stable to varus/valgus stress.  Knee extensor mechanism intact.  0 degrees knee extension. Flexion to 125 degrees. Calf soft, non-tender.  Sensation and neurovascularly intact.  Demonstrates active ankle dorsiflexion and plantarflexion.  Palpable pedal pulses.               Imaging Results (Most Recent)       Procedure Component Value Units Date/Time    XR Knee 3 View Left [[id number]] Resulted: 01/30/25 0810     Updated: 01/30/25 0810    Narrative:      X-Ray Report:  Study: X-rays ordered, taken in the office, and reviewed today.   Site: Left knee Xray  Indication: Left total knee arthroplasty follow up.   View: AP/Lateral, Standing, and [place] view(s)  Findings: Intact left total knee arthroplasty. No evidence of hardware   malfunction, complication or loosening. No periprosthetic fractures   visualized. Patella is midline tracking on sunrise view.   Prior studies available for comparison: yes                    Assessment and Plan   Diagnoses and all orders for this " visit:    1. Aftercare following left knee joint replacement surgery (Primary)  -     XR Knee 3 View Left    2. History of total knee arthroplasty, left         Mahsa Spain is 2 year(s) post-op left total knee arthroplasty performed by Dr Card on 1/27/23. X-rays reviewed, showing hardware intact and no complications.  Patient is doing well. Continue home exercise program to progress strength and ROM.     Discussed the importance of lifelong antibiotic prophylaxis with dental procedures following total joint replacement. In the event of a dental procedure, patient is welcome to contact our office to obtain antibiotics prior to the procedure if their dentist does not provide the prescription.     We also discussed that if a fall/injury were to occur to the affected extremity was advised for patient to obtain x-rays for clarification that no hardware has been compromised or if showing signs of loosening.    Patient verbalized understanding.     Consider routine follow-up in 1 year.  However if she is doing well, she was recommended to follow-up as needed. We will repeat xray's of the prosthetic joint at that time. Call sooner or return to care, if needed with any changes or concerns.        Tobacco Use: Low Risk  (1/30/2025)    Patient History     Smoking Tobacco Use: Never     Smokeless Tobacco Use: Never     Passive Exposure: Never     Patient reports that they are a nonsmoker; cessation education not applicable.            Follow Up   No follow-ups on file.  There are no Patient Instructions on file for this visit.    Patient was given instructions and counseling regarding her condition or for health maintenance advice. Please see specific information pulled into the AVS if appropriate.       Dictated Utilizing Dragon Dictation. Please note that portions of this note were completed with a voice recognition program. Part of this note may be an electronic transcription/translation of spoken language to printed  text using the Dragon Dictation System.

## 2025-02-13 RX ORDER — METFORMIN HYDROCHLORIDE 500 MG/1
500 TABLET, EXTENDED RELEASE ORAL
Qty: 90 TABLET | Refills: 4 | Status: SHIPPED | OUTPATIENT
Start: 2025-02-13

## 2025-02-13 RX ORDER — ATORVASTATIN CALCIUM 10 MG/1
10 TABLET, FILM COATED ORAL DAILY
Qty: 90 TABLET | Refills: 4 | Status: SHIPPED | OUTPATIENT
Start: 2025-02-13

## 2025-03-26 RX ORDER — LISINOPRIL AND HYDROCHLOROTHIAZIDE 20; 25 MG/1; MG/1
1 TABLET ORAL DAILY
Qty: 90 TABLET | Refills: 2 | Status: SHIPPED | OUTPATIENT
Start: 2025-03-26

## 2025-06-18 ENCOUNTER — TELEPHONE (OUTPATIENT)
Dept: FAMILY MEDICINE CLINIC | Facility: CLINIC | Age: 65
End: 2025-06-18
Payer: MEDICARE

## 2025-06-18 DIAGNOSIS — E11.9 TYPE 2 DIABETES MELLITUS WITHOUT COMPLICATION, WITHOUT LONG-TERM CURRENT USE OF INSULIN: Primary | ICD-10-CM

## 2025-06-18 NOTE — TELEPHONE ENCOUNTER
Caller: Mahsa Spain    Relationship: Self    Best call back number:     [phone]        What orders are you requesting (i.e. lab or imaging): ROUTINE LABS    In what timeframe would the patient need to come in: 6.19.2025    Where will you receive your lab/imaging services: IN OFFICE    Additional notes: PLEASE CONTACT PATIENT IF ABLE TO GET LABS ORDERED.

## 2025-06-20 ENCOUNTER — OFFICE VISIT (OUTPATIENT)
Dept: FAMILY MEDICINE CLINIC | Facility: CLINIC | Age: 65
End: 2025-06-20
Payer: MEDICARE

## 2025-06-20 VITALS
WEIGHT: 170.6 LBS | TEMPERATURE: 97 F | OXYGEN SATURATION: 97 % | HEART RATE: 70 BPM | DIASTOLIC BLOOD PRESSURE: 70 MMHG | BODY MASS INDEX: 30.23 KG/M2 | HEIGHT: 63 IN | SYSTOLIC BLOOD PRESSURE: 140 MMHG | RESPIRATION RATE: 18 BRPM

## 2025-06-20 DIAGNOSIS — J01.90 ACUTE NON-RECURRENT SINUSITIS, UNSPECIFIED LOCATION: ICD-10-CM

## 2025-06-20 DIAGNOSIS — Z00.00 ENCOUNTER FOR ANNUAL WELLNESS EXAM IN MEDICARE PATIENT: Primary | ICD-10-CM

## 2025-06-20 DIAGNOSIS — I10 ESSENTIAL HYPERTENSION: ICD-10-CM

## 2025-06-20 DIAGNOSIS — F41.9 ANXIETY: ICD-10-CM

## 2025-06-20 DIAGNOSIS — E78.5 HYPERLIPIDEMIA, UNSPECIFIED HYPERLIPIDEMIA TYPE: ICD-10-CM

## 2025-06-20 DIAGNOSIS — E11.9 TYPE 2 DIABETES MELLITUS WITHOUT COMPLICATION, WITHOUT LONG-TERM CURRENT USE OF INSULIN: ICD-10-CM

## 2025-06-20 RX ORDER — AZITHROMYCIN 250 MG/1
TABLET, FILM COATED ORAL
Qty: 6 TABLET | Refills: 0 | Status: SHIPPED | OUTPATIENT
Start: 2025-06-20 | End: 2025-06-23 | Stop reason: ALTCHOICE

## 2025-06-20 NOTE — ASSESSMENT & PLAN NOTE
- Reports sensitivity to cold in a tooth, potentially related to sinus issues. History of sinus infections and allergies, with recent congestion and drainage but no significant pain or fever.  - Examination revealed slightly enlarged lymph nodes without pain.  - Discussed the possibility of sinus infection causing dental pain. No headache or fever reported.  - Prescription for azithromycin (Z-Henry) issued to address potential sinus infection. Medication sent to pharmacy. Further evaluation if symptoms persist.

## 2025-06-20 NOTE — ASSESSMENT & PLAN NOTE
Hypertension is stable.  Continue current treatment regimen.  Dietary sodium restriction.  Weight loss.  Regular aerobic exercise.  Blood pressure will be reassessed at the next regular appointment.

## 2025-06-20 NOTE — PROGRESS NOTES
Subjective   The ABCs of the Annual Wellness Visit  Medicare Wellness Visit      Mahsa Spain is a 64 y.o. patient who presents for a Medicare Wellness Visit.    The following portions of the patient's history were reviewed and   updated as appropriate: allergies, current medications, past family history, past medical history, past social history, past surgical history, and problem list.    Compared to one year ago, the patient's physical   health is the same.  Compared to one year ago, the patient's mental   health is the same.    Recent Hospitalizations:  She was not admitted to the hospital during the last year.     Current Medical Providers:  Patient Care Team:  Celsa Herndon APRN as PCP - General (Nurse Practitioner)    Outpatient Medications Prior to Visit   Medication Sig Dispense Refill    Apple Cider Vinegar 500 MG tablet Take 500 mg by mouth Daily.      atorvastatin (LIPITOR) 10 MG tablet TAKE 1 TABLET BY MOUTH DAILY. 90 tablet 4    BLACK ELDERBERRY PO Take 50 mg by mouth.      Cranberry 500 MG capsule Take 500 mg by mouth Daily.      folic acid (FOLVITE) 400 MCG tablet Take 1 tablet by mouth Daily.      Garlic 100 MG tablet Take  by mouth.      glucose blood (OneTouch Verio) test strip USE AS DIRECTED TO TEST BLOOD GLUCOSE DAILY 100 each 3    Lancets 33G misc 1 each Daily. 1 each 3    lisinopril-hydrochlorothiazide (PRINZIDE,ZESTORETIC) 20-25 MG per tablet TAKE ONE TABLET BY MOUTH ONCE DAILY 90 tablet 2    metFORMIN ER (GLUCOPHAGE-XR) 500 MG 24 hr tablet TAKE 1 TABLET BY MOUTH DAILY WITH BREAKFAST. 90 tablet 4    metoprolol succinate XL (TOPROL-XL) 100 MG 24 hr tablet Take 1 tablet by mouth Daily. 90 tablet 3    multivitamin with minerals (MULTIVITAL PO) Take 1 tablet by mouth Daily.      azithromycin (Zithromax Z-Henry) 250 MG tablet UAD 6 tablet 0    Cholecalciferol 10 MCG (400 UNIT) capsule Take 400 Units by mouth Daily.      Lancets (OneTouch Delica Plus [id number]) misc USE TO CHECK BLOOD  "SUGAR TWICE DAILY 100 each 11    Magnesium 250 MG tablet Take 1 tablet by mouth Daily. (Patient not taking: Reported on 6/20/2025)      Turmeric 500 MG capsule Take 1 capsule by mouth Daily. (Patient not taking: Reported on 6/20/2025)      Zinc 50 MG tablet Take 1 tablet by mouth Daily. (Patient not taking: Reported on 6/20/2025)       No facility-administered medications prior to visit.     No opioid medication identified on active medication list. I have reviewed chart for other potential  high risk medication/s and harmful drug interactions in the elderly.      Aspirin is not on active medication list.  Aspirin use is not indicated based on review of current medical condition/s. Risk of harm outweighs potential benefits.  .    Patient Active Problem List   Diagnosis    Essential hypertension    Overweight (BMI 25.0-29.9)    Anxiety    Type 2 diabetes mellitus, without long-term current use of insulin    Insomnia    Arthritis    Hyperlipidemia    Encounter for annual wellness exam in Medicare patient    OA (osteoarthritis) of knee    Acute non-recurrent sinusitis     Advance Care Planning Advance Directive is on file.  ACP discussion was held with the patient during this visit. Patient has an advance directive in EMR which is still valid.             Objective   Vitals:    06/20/25 0904 06/20/25 0912   BP: 157/66 140/70   BP Location: Left arm Left arm   Patient Position: Sitting Sitting   Cuff Size: Adult Adult   Pulse: 70    Resp: 18    Temp: 97 °F (36.1 °C)    TempSrc: Temporal    SpO2: 97%    Weight: 77.4 kg (170 lb 9.6 oz)    Height: 160 cm (62.99\")    PainSc: 0-No pain        Estimated body mass index is 30.23 kg/m² as calculated from the following:    Height as of this encounter: 160 cm (62.99\").    Weight as of this encounter: 77.4 kg (170 lb 9.6 oz).            Gait and Balance Evaluation:  Normal      Does the patient have evidence of cognitive impairment? No                                               "                                                  Health  Risk Assessment    Smoking Status:  Social History     Tobacco Use   Smoking Status Never    Passive exposure: Never   Smokeless Tobacco Never     Alcohol Consumption:  Social History     Substance and Sexual Activity   Alcohol Use Not Currently    Comment: Rarely drinks,        Fall Risk Screen  SANCHEZ Fall Risk Assessment was completed, and patient is at LOW risk for falls.Assessment completed on:2025    Depression Screening   Little interest or pleasure in doing things? Not at all   Feeling down, depressed, or hopeless? Not at all   PHQ-2 Total Score 0      Health Habits and Functional and Cognitive Screenin/20/2025     9:00 AM   Functional & Cognitive Status   Do you have difficulty preparing food and eating? No   Do you have difficulty bathing yourself, getting dressed or grooming yourself? No   Do you have difficulty using the toilet? No   Do you have difficulty moving around from place to place? No   Do you have trouble with steps or getting out of a bed or a chair? No   Current Diet Well Balanced Diet   Dental Exam Up to date   Eye Exam Up to date   Exercise (times per week) 7 times per week   Current Exercises Include House Cleaning;Gardening;Yard Work;Walking   Do you need help using the phone?  No   Are you deaf or do you have serious difficulty hearing?  No   Do you need help to go to places out of walking distance? No   Do you need help shopping? No   Do you need help preparing meals?  No   Do you need help with housework?  No   Do you need help with laundry? No   Do you need help taking your medications? No   Do you need help managing money? No   Do you ever drive or ride in a car without wearing a seat belt? No   Have you felt unusual stress, anger or loneliness in the last month? No   Who do you live with? Alone   If you need help, do you have trouble finding someone available to you? No   Have you been bothered in the last four  weeks by sexual problems? No   Do you have difficulty concentrating, remembering or making decisions? No           Age-appropriate Screening Schedule:  Refer to the list below for future screening recommendations based on patient's age, sex and/or medical conditions. Orders for these recommended tests are listed in the plan section. The patient has been provided with a written plan.    Health Maintenance List  Health Maintenance   Topic Date Due    TDAP/TD VACCINES (1 - Tdap) Never done    DXA SCAN  12/13/2023    COVID-19 Vaccine (4 - 2024-25 season) 09/01/2024    DIABETIC FOOT EXAM  02/01/2025    HEMOGLOBIN A1C  02/27/2025    INFLUENZA VACCINE  07/01/2025    LIPID PANEL  08/27/2025    DIABETIC EYE EXAM  09/02/2025    URINE MICROALBUMIN-CREATININE RATIO (uACR)  09/25/2025    ANNUAL WELLNESS VISIT  06/20/2026    COLORECTAL CANCER SCREENING  10/31/2026    MAMMOGRAM  12/03/2026    HEPATITIS C SCREENING  Completed    Pneumococcal Vaccine 50+  Completed    ZOSTER VACCINE  Completed                                                                                                                                                CMS Preventative Services Quick Reference  Risk Factors Identified During Encounter  None Identified    The above risks/problems have been discussed with the patient.  Pertinent information has been shared with the patient in the After Visit Summary.  An After Visit Summary and PPPS were made available to the patient.    Follow Up:   Next Medicare Wellness visit to be scheduled in 1 year.          Additional E&M Note during same encounter follows:  Patient has multiple medical problems which are significant and separately identifiable that require additional work above and beyond the Medicare Wellness Visit.      Chief Complaint  Hypertension, Med Refill, and Medicare Wellness-Initial Visit (No concerns.)    Mahsa Spain is a 64 y.o. female who presents to [facility]  "    History of Present Illness  The patient is a 64-year-old female who presents for an annual wellness visit.    She reports experiencing sensitivity in one of her molars, which is triggered by exposure to cold water or beverages. This discomfort is transient and does not persist beyond the initial contact with the cold substance. Her dentist suggested a potential link between her dental issue and sinus problems, given her history of allergies and sinus infections. However, no radiographic examination was conducted to confirm this hypothesis. She typically experiences 1 or 2 sinus infections annually, but has not had any this year. She also reports frequent postnasal drainage, particularly over the past 1 to 2 months, which she attributes to pollen exposure.    She does not experience sinus pain or pressure, but notes occasional headaches, which she believes may be related to heat exposure. She does not have any ear pain or fever but reports typical congestion. She occasionally coughs due to throat irritation but does not have a sore throat.    Objective   Vital Signs:   Vitals:    06/20/25 0904 06/20/25 0912   BP: 157/66 140/70   BP Location: Left arm Left arm   Patient Position: Sitting Sitting   Cuff Size: Adult Adult   Pulse: 70    Resp: 18    Temp: 97 °F (36.1 °C)    TempSrc: Temporal    SpO2: 97%    Weight: 77.4 kg (170 lb 9.6 oz)    Height: 160 cm (62.99\")    PainSc: 0-No pain        Wt Readings from Last 3 Encounters:   06/20/25 77.4 kg (170 lb 9.6 oz)   01/30/25 75.8 kg (167 lb)   09/25/24 75.8 kg (167 lb)     BP Readings from Last 3 Encounters:   06/20/25 140/70   01/30/25 149/87   09/25/24 135/73       Physical Exam  Vitals reviewed.   Constitutional:       General: She is not in acute distress.  HENT:      Head: Normocephalic.      Right Ear: Tympanic membrane normal.      Left Ear: Tympanic membrane normal.      Nose: Nose normal.      Mouth/Throat:      Pharynx: Oropharynx is clear. No posterior " oropharyngeal erythema.   Eyes:      General: No scleral icterus.     Extraocular Movements: Extraocular movements intact.      Conjunctiva/sclera: Conjunctivae normal.      Pupils: Pupils are equal, round, and reactive to light.   Cardiovascular:      Rate and Rhythm: Normal rate and regular rhythm.      Pulses: Normal pulses.      Heart sounds: Normal heart sounds.   Pulmonary:      Effort: Pulmonary effort is normal.      Breath sounds: Normal breath sounds.   Abdominal:      General: Bowel sounds are normal.      Palpations: Abdomen is soft.   Musculoskeletal:         General: Normal range of motion.      Cervical back: Neck supple.   Skin:     General: Skin is warm and dry.   Neurological:      Mental Status: She is alert and oriented to person, place, and time.   Psychiatric:         Mood and Affect: Mood normal.         Behavior: Behavior normal.         Thought Content: Thought content normal.         Judgment: Judgment normal.         Physical Exam  Mouth/Throat: Mucous membranes moist, no erythema, no exudate  Neck: Lymph nodes slightly enlarged, no pain    The following data was reviewed by MANNY Donis on 06/20/2025  Common Labs   Common labs          8/27/2024    08:08 9/25/2024    14:36   Common Labs   Glucose 115     BUN 9     Creatinine 0.81     Sodium 138     Potassium 3.6     Chloride 99     Calcium 10.2     Albumin 4.4     Total Bilirubin 0.5     Alkaline Phosphatase 66     AST (SGOT) 23     ALT (SGPT) 16     WBC 6.14     Hemoglobin 13.6     Hematocrit 39.6     Platelets 314     Total Cholesterol 151     Triglycerides 103     HDL Cholesterol 49     LDL Cholesterol  83     Hemoglobin A1C 6.10     Microalbumin, Urine  <1.2        Results      Consultant notes ortho     Assessment & Plan   Diagnoses and all orders for this visit:    1. Encounter for annual wellness exam in Medicare patient (Primary)  Assessment & Plan:  Discussed age-appropriate preventative counseling including all  recommended screenings and immunizations, dental health, daily sunscreen, and seatbelt use.. Discussed issues common to age group and preventive medicine services, as well as any needed anticipatory guidance. Written information provided to patient. All questions answered. Pt verbalized understanding.         2. Type 2 diabetes mellitus without complication, without long-term current use of insulin  Assessment & Plan:  Diabetes is improving with treatment.  Well controlled  Continue current treatment regimen.        3. Essential hypertension  Assessment & Plan:  Hypertension is stable.  Continue current treatment regimen.  Dietary sodium restriction.  Weight loss.  Regular aerobic exercise.  Blood pressure will be reassessed at the next regular appointment.        4. Hyperlipidemia, unspecified hyperlipidemia type  Assessment & Plan:  Lipid abnormalities are improving with treatment.  Continue with lifestyle mods.  Lipids will be reassessed in 6 months.      5. Anxiety  Assessment & Plan:  Psychological condition is improving with treatment.  Continue current treatment regimen.  Regular aerobic exercise.  Psychological condition  will be reassessed at the next regular appointment.         6. Acute non-recurrent sinusitis, unspecified location  Assessment & Plan:  - Reports sensitivity to cold in a tooth, potentially related to sinus issues. History of sinus infections and allergies, with recent congestion and drainage but no significant pain or fever.  - Examination revealed slightly enlarged lymph nodes without pain.  - Discussed the possibility of sinus infection causing dental pain. No headache or fever reported.  - Prescription for azithromycin (Z-Henry) issued to address potential sinus infection. Medication sent to pharmacy. Further evaluation if symptoms persist.      Other orders  -     azithromycin (Zithromax Z-Henry) 250 MG tablet; UAD  Dispense: 6 tablet; Refill: 0        Assessment & Plan  1. Sinus  infection.                 FOLLOW UP  Return if symptoms worsen or fail to improve.  Patient was given instructions and counseling regarding her condition or for health maintenance advice. Please see specific information pulled into the AVS if appropriate.     Patient or patient representative verbalized consent for the use of Ambient Listening during the visit with  MANNY Donis for chart documentation. 6/20/2025  09:06 EDT    MANNY Donis  06/20/25  09:51 EDT

## 2025-06-23 ENCOUNTER — TELEPHONE (OUTPATIENT)
Dept: FAMILY MEDICINE CLINIC | Facility: CLINIC | Age: 65
End: 2025-06-23
Payer: MEDICARE

## 2025-06-23 RX ORDER — AMOXICILLIN 500 MG/1
500 CAPSULE ORAL 2 TIMES DAILY
Qty: 20 CAPSULE | Refills: 0 | Status: SHIPPED | OUTPATIENT
Start: 2025-06-23 | End: 2025-07-03

## 2025-06-23 NOTE — TELEPHONE ENCOUNTER
Pt called in asking if we can prescribe something else. The Zpac is severally upsetting her stomach. Requested Amoxicillin

## 2025-06-23 NOTE — TELEPHONE ENCOUNTER
Caller: [place] Mahsa    Relationship: Self    Best call back number: [phone]    What medication are you requesting: AMOXICILLIN    What are your current symptoms: N/A    How long have you been experiencing symptoms: N/A    Have you had these symptoms before:    [] Yes  [] No    Have you been treated for these symptoms before:   [] Yes  [] No    If a prescription is needed, what is your preferred pharmacy and phone number: [facility] - [place], [place] - [address] [facility].  [phone] [facility] [phone] FX     Additional notes:PATIENT WAS SEEN FOR SICKNESS AND PRESCRIBED A ZPACK AND THIS IS UPSETTING HER STOMACH IN TERRIBLE WAYS. PLEASE SEND NEW PRESCRIPTION FOR AMOXICILLIN THAT IS MORE TOLERABLE TO PHARMACY ASAP.

## 2025-06-24 ENCOUNTER — LAB (OUTPATIENT)
Dept: LAB | Facility: HOSPITAL | Age: 65
End: 2025-06-24
Payer: MEDICARE

## 2025-06-24 DIAGNOSIS — E11.9 TYPE 2 DIABETES MELLITUS WITHOUT COMPLICATION, WITHOUT LONG-TERM CURRENT USE OF INSULIN: ICD-10-CM

## 2025-06-24 LAB
ALBUMIN SERPL-MCNC: 4.5 G/DL (ref 3.5–5.2)
ALBUMIN UR-MCNC: 1.2 MG/DL
ALBUMIN/GLOB SERPL: 1.6 G/DL
ALP SERPL-CCNC: 64 U/L (ref 39–117)
ALT SERPL W P-5'-P-CCNC: 15 U/L (ref 1–33)
ANION GAP SERPL CALCULATED.3IONS-SCNC: 13.6 MMOL/L (ref 5–15)
AST SERPL-CCNC: 25 U/L (ref 1–32)
BILIRUB SERPL-MCNC: 0.5 MG/DL (ref 0–1.2)
BUN SERPL-MCNC: 8 MG/DL (ref 8–23)
BUN/CREAT SERPL: 9.1 (ref 7–25)
CALCIUM SPEC-SCNC: 10.3 MG/DL (ref 8.6–10.5)
CHLORIDE SERPL-SCNC: 96 MMOL/L (ref 98–107)
CHOLEST SERPL-MCNC: 135 MG/DL (ref 0–200)
CO2 SERPL-SCNC: 28.4 MMOL/L (ref 22–29)
CREAT SERPL-MCNC: 0.88 MG/DL (ref 0.57–1)
CREAT UR-MCNC: 110.7 MG/DL
DEPRECATED RDW RBC AUTO: 41.4 FL (ref 37–54)
EGFRCR SERPLBLD CKD-EPI 2021: 73 ML/MIN/1.73
ERYTHROCYTE [DISTWIDTH] IN BLOOD BY AUTOMATED COUNT: 13.3 % (ref 12.3–15.4)
GLOBULIN UR ELPH-MCNC: 2.9 GM/DL
GLUCOSE SERPL-MCNC: 107 MG/DL (ref 65–99)
HBA1C MFR BLD: 6.3 % (ref 4.8–5.6)
HCT VFR BLD AUTO: 39.8 % (ref 34–46.6)
HDLC SERPL-MCNC: 54 MG/DL (ref 40–60)
HGB BLD-MCNC: 13.5 G/DL (ref 12–15.9)
LDLC SERPL CALC-MCNC: 64 MG/DL (ref 0–100)
LDLC/HDLC SERPL: 1.16 {RATIO}
MCH RBC QN AUTO: 29.5 PG (ref 26.6–33)
MCHC RBC AUTO-ENTMCNC: 33.9 G/DL (ref 31.5–35.7)
MCV RBC AUTO: 87.1 FL (ref 79–97)
MICROALBUMIN/CREAT UR: 10.8 MG/G (ref 0–29)
PLATELET # BLD AUTO: 343 10*3/MM3 (ref 140–450)
PMV BLD AUTO: 8.8 FL (ref 6–12)
POTASSIUM SERPL-SCNC: 3.4 MMOL/L (ref 3.5–5.2)
PROT SERPL-MCNC: 7.4 G/DL (ref 6–8.5)
RBC # BLD AUTO: 4.57 10*6/MM3 (ref 3.77–5.28)
SODIUM SERPL-SCNC: 138 MMOL/L (ref 136–145)
T4 FREE SERPL-MCNC: 1.35 NG/DL (ref 0.92–1.68)
TRIGL SERPL-MCNC: 92 MG/DL (ref 0–150)
TSH SERPL DL<=0.05 MIU/L-ACNC: 0.34 UIU/ML (ref 0.27–4.2)
VLDLC SERPL-MCNC: 17 MG/DL (ref 5–40)
WBC NRBC COR # BLD AUTO: 6.94 10*3/MM3 (ref 3.4–10.8)

## 2025-06-24 PROCEDURE — 84443 ASSAY THYROID STIM HORMONE: CPT

## 2025-06-24 PROCEDURE — 82043 UR ALBUMIN QUANTITATIVE: CPT

## 2025-06-24 PROCEDURE — 80061 LIPID PANEL: CPT

## 2025-06-24 PROCEDURE — 83036 HEMOGLOBIN GLYCOSYLATED A1C: CPT

## 2025-06-24 PROCEDURE — 84439 ASSAY OF FREE THYROXINE: CPT

## 2025-06-24 PROCEDURE — 36415 COLL VENOUS BLD VENIPUNCTURE: CPT

## 2025-06-24 PROCEDURE — 80053 COMPREHEN METABOLIC PANEL: CPT

## 2025-06-24 PROCEDURE — 82570 ASSAY OF URINE CREATININE: CPT

## 2025-06-24 PROCEDURE — 85027 COMPLETE CBC AUTOMATED: CPT

## 2025-08-25 ENCOUNTER — TELEPHONE (OUTPATIENT)
Dept: FAMILY MEDICINE CLINIC | Facility: CLINIC | Age: 65
End: 2025-08-25
Payer: MEDICARE

## 2025-08-25 DIAGNOSIS — Z12.31 ENCOUNTER FOR SCREENING MAMMOGRAM FOR MALIGNANT NEOPLASM OF BREAST: Primary | ICD-10-CM

## (undated) DEVICE — FAN SPRAY KIT: Brand: PULSAVAC®

## (undated) DEVICE — TOTAL KNEE-LF: Brand: MEDLINE INDUSTRIES, INC.

## (undated) DEVICE — NO-SCRATCH ™ SMALL WHITNEY CURETTE ™ IS A SINGLE-USE, PLASTIC CURETTE FOR QUICKLY APPLYING, MANIPULATING AND REMOVING BONE CEMENT DURING HIP AND KNEE REPLACEMENT SURGERY. THE PLASTIC IS SOFTER THAN STEEL INSTRUMENTS, REDUCING THE RISK OF DAMAGING THE PROSTHESIS WITH METAL INSTRUMENTS.  THE CURETTE’S 6MM TIP REMOVES EXCESS CEMENT FROM REPLACEMENT HIPS AND KNEES. EASY-TO-MANEUVER, THE SMALL BLUE CURETTE LETS YOU REMOVE CEMENT FROM ALL EDGES OF THE PROSTHESIS.NO-SCRATCH WHITNEY SMALL CURETTE FEATURES:SAFER THAN STEEL- MADE OF PLASTIC - STURDY YET SOFTER THAN SURGICAL STEEL.HANDIER- EACH TOOL HAS A MOLDED-IN THUMB INDENTATION INSTANTLY ORIENTING THE TOOL.- EASIER TO MANEUVER IN HARD TO SEE PLACES.- COLOR-CODED FOR EASY IDENTIFICATION.FASTER- COMES INDIVIDUALLY PACKAGED IN STERILE, PEEL OPEN POUCH, READY TO GO.- APPLIES, MANIPULATES, OR REMOVES CEMENT WITH FINGERTIP PRECISION.ECONOMICAL- THE COST OF A SINGLE REVISION DWARFS THE COST OF A SINGLE-USE CURETTE. - DISPOSABLE – THERE’S NO NEED TO WASTE TIME REMOVING HARDENED CEMENT OR RE-STERILIZING TOOLS.- LESS EXPENSIVE TO BUY AND INVENTORY - ORDER ONLY THE TOOL YOU USE.- PACKAGED 25 INDIVIDUALLY WRAPPED TOOLS TO A CARTON FOR CONVENIENT SHELF STORAGE.: Brand: WHITNEY NO-SCRATCH CURETTE (SMALL)

## (undated) DEVICE — MAT FLR ABS W/BLU/LINER 56X72IN WHT

## (undated) DEVICE — DISPOSABLE TOURNIQUET CUFF SINGLE BLADDER, SINGLE PORT AND QUICK CONNECT CONNECTOR: Brand: COLOR CUFF

## (undated) DEVICE — SOL IRR NACL 0.9PCT 3000ML

## (undated) DEVICE — SYR LUERLOK 30CC

## (undated) DEVICE — GLV SURG SENSICARE PI PF LF 7 GRN STRL

## (undated) DEVICE — PLASMABLADE PS200-040 4.0: Brand: PLASMABLADE™

## (undated) DEVICE — GLV SURG SENSICARE SLT PF LF 7 STRL

## (undated) DEVICE — 450 ML BOTTLE OF 0.05% CHLORHEXIDINE GLUCONATE IN 99.95% STERILE WATER FOR IRRIGATION, USP AND APPLICATOR.: Brand: IRRISEPT ANTIMICROBIAL WOUND LAVAGE

## (undated) DEVICE — APPL CHLORAPREP HI/LITE 26ML ORNG

## (undated) DEVICE — ESMARK: Brand: DEROYAL

## (undated) DEVICE — BNDG ELAS MATRX V/CLS 6INX10YD LF

## (undated) DEVICE — SUT VIC 2/0 CT1 36IN

## (undated) DEVICE — COVER,LIGHT HANDLE,FLX,1/PK: Brand: MEDLINE INDUSTRIES, INC.

## (undated) DEVICE — TOWEL,OR,DSP,ST,BLUE,STD,4/PK,20PK/CS: Brand: MEDLINE

## (undated) DEVICE — STERILE PATIENT PROTECTIVE PAD FOR IMP® KNEE POSITIONERS & COHESIVE WRAP (10 / CASE): Brand: DE MAYO KNEE POSITIONER®

## (undated) DEVICE — GLV SURG SENSICARE SLT PF LF 8.5 STRL

## (undated) DEVICE — CVR LEG BOOTLEG F/R NOSKID 33IN

## (undated) DEVICE — BASIC SINGLE BASIN-LF: Brand: MEDLINE INDUSTRIES, INC.

## (undated) DEVICE — GLV SURG BIOGEL LTX PF 8 1/2

## (undated) DEVICE — SLV SCD KN/LEN ADJ EXPRSS BLENDED MD 1P/U

## (undated) DEVICE — INTENDED FOR TISSUE SEPARATION, AND OTHER PROCEDURES THAT REQUIRE A SHARP SURGICAL BLADE TO PUNCTURE OR CUT.: Brand: BARD-PARKER ® CARBON RIB-BACK BLADES

## (undated) DEVICE — DRAPE,U/ SHT,SPLIT,PLAS,STERIL: Brand: MEDLINE

## (undated) DEVICE — Device

## (undated) DEVICE — 3 BONE CEMENT MIXER: Brand: MIXEVAC

## (undated) DEVICE — DRSNG SURG AQUACEL AG/ADVNTGE 9X25CM 3.5X10IN

## (undated) DEVICE — PROXIMATE RH ROTATING HEAD SKIN STAPLERS (35 WIDE) CONTAINS 35 STAINLESS STEEL STAPLES: Brand: PROXIMATE

## (undated) DEVICE — STRYKER PERFORMANCE SERIES SAGITTAL BLADE: Brand: STRYKER PERFORMANCE SERIES

## (undated) DEVICE — UNDERCAST PADDING: Brand: DEROYAL

## (undated) DEVICE — ANTIBACTERIAL VIOLET BRAIDED (POLYGLACTIN 910), SYNTHETIC ABSORBABLE SURGICAL SUTURE: Brand: COATED VICRYL

## (undated) DEVICE — NDL HYPO ECLPS SFTY 18G 1 1/2IN

## (undated) DEVICE — GAUZE,SPONGE,4"X4",16PLY,STRL,LF,10/TRAY: Brand: MEDLINE

## (undated) DEVICE — GLV SURG SENSICARE SLT PF LF 6.5 STRL

## (undated) DEVICE — PULLOVER TOGA, 2X LARGE: Brand: FLYTE, SURGICOOL